# Patient Record
Sex: FEMALE | Race: WHITE | NOT HISPANIC OR LATINO | Employment: OTHER | URBAN - METROPOLITAN AREA
[De-identification: names, ages, dates, MRNs, and addresses within clinical notes are randomized per-mention and may not be internally consistent; named-entity substitution may affect disease eponyms.]

---

## 2017-08-15 ENCOUNTER — OFFICE VISIT (OUTPATIENT)
Dept: OBGYN | Facility: CLINIC | Age: 32
End: 2017-08-15
Payer: COMMERCIAL

## 2017-08-15 VITALS — HEIGHT: 62 IN | BODY MASS INDEX: 31.28 KG/M2 | WEIGHT: 170 LBS

## 2017-08-15 DIAGNOSIS — L73.9 FOLLICULITIS: Primary | ICD-10-CM

## 2017-08-15 PROCEDURE — 99213 OFFICE O/P EST LOW 20 MIN: CPT | Performed by: NURSE PRACTITIONER

## 2017-08-15 NOTE — MR AVS SNAPSHOT
"              After Visit Summary   8/15/2017    Julia Boss    MRN: [id number]           Patient Information     Date Of Birth          1985        Visit Information        Provider Department      8/15/2017 9:00 AM Monet Carlson APRN CNP [facility]         Follow-ups after your visit        Follow-up notes from your care team     Return if symptoms worsen or fail to improve.      Who to contact     If you have questions or need follow up information about today's clinic visit or your schedule please contact [facility] directly at [phone].  Normal or non-critical lab and imaging results will be communicated to you by VitalMedixhart, letter or phone within 4 business days after the clinic has received the results. If you do not hear from us within 7 days, please contact the clinic through VitalMedixhart or phone. If you have a critical or abnormal lab result, we will notify you by phone as soon as possible.  Submit refill requests through Akvo or call your pharmacy and they will forward the refill request to us. Please allow 3 business days for your refill to be completed.          Additional Information About Your Visit        MyChart Information     Akvo lets you send messages to your doctor, view your test results, renew your prescriptions, schedule appointments and more. To sign up, go to [email or web address] . Click on \"Log in\" on the left side of the screen, which will take you to the Welcome page. Then click on \"Sign up Now\" on the right side of the page.     You will be asked to enter the access code listed below, as well as some personal information. Please follow the directions to create your username and password.     Your access code is: [email or web address]  Expires: 2017  9:37 AM     Your access code will  in 90 days. If you need help or a new code, please call your [facility] or [phone].        Care EveryWhere ID     This " "is your Care EveryWhere ID. This could be used by other organizations to access your [place] medical records  [id number]        Your Vitals Were     Height Last Period BMI (Body Mass Index)             5' 2.25\" (1.581 m) 07/25/2017 (Approximate) 30.84 kg/m2          Blood Pressure from Last 3 Encounters:   08/31/16 116/68   02/29/16 110/68   07/06/15 104/64    Weight from Last 3 Encounters:   08/15/17 170 lb (77.1 kg)   08/31/16 169 lb (76.7 kg)   02/29/16 156 lb (70.8 kg)              Today, you had the following     No orders found for display       Primary Care Provider    None Specified       No primary provider on file.        Equal Access to Services     NATASHA CASTRO : Suresh Schafer, waleobardo oates, qaybta kaalmada conchita, nicanor castro. So [facility] [phone].    ATENCIÓN: Si habla español, tiene a knight disposición servicios gratuitos de asistencia lingüística. Llame al [phone].    We comply with applicable federal civil rights laws and [place] laws. We do not discriminate on the basis of race, color, national origin, age, disability sex, sexual orientation or gender identity.            Thank you!     Thank you for choosing [facility]  for your care. Our goal is always to provide you with excellent care. Hearing back from our patients is one way we can continue to improve our services. Please take a few minutes to complete the written survey that you may receive in the mail after your visit with us. Thank you!             Your Updated Medication List - Protect others around you: Learn how to safely use, store and throw away your medicines at www.disposemymeds.org.      Notice  As of 8/15/2017  9:37 AM    You have not been prescribed any medications.      "

## 2017-08-15 NOTE — PROGRESS NOTES
"    SUBJECTIVE:                                                   Julia Boss is a 31 year old female who presents to clinic today for the following health issue(s):  Patient presents with:  Vaginal Problem: Vaginal bump on labia, patient states it does not hurt nor bother her      HPI: Patient has noticed a bump on left labia, not really painful does feel hard.  Patient does shave in area.      Patient's last menstrual period was 2017 (approximate)..   Patient is not sexually active, .  Using none for contraception.    reports that she has never smoked. She has never used smokeless tobacco.      STD testing offered?  Declined    Health maintenance updated:  yes    Today's PHQ-2 Score:   PHQ-2 (  [employer]) 2016   Q1: Little interest or pleasure in doing things 0   Q2: Feeling down, depressed or hopeless 0   PHQ-2 Score 0     Today's PHQ-9 Score:   PHQ-9 SCORE 2016   Total Score 5     Today's ASAEL-7 Score:   ASAEL-7 SCORE 2016   Total Score 2       Problem list and histories reviewed & adjusted, as indicated.  Additional history: as documented.    Patient Active Problem List   Diagnosis     Overweight     Acne, unspecified acne type     Uses oral contraception     Herpes simplex vulvovaginitis     Anxiety     Moderate episode of recurrent major depressive disorder (H)     Past Surgical History:   Procedure Laterality Date     NO HISTORY OF SURGERY        Social History   Substance Use Topics     Smoking status: Never Smoker     Smokeless tobacco: Never Used     Alcohol use Not on file      Problem (# of Occurrences) Relation (Name,Age of Onset)    Thyroid Disease (1) Mother            No current outpatient prescriptions on file.     No current facility-administered medications for this visit.      No Known Allergies    ROS:  12 point review of systems negative other than symptoms noted below.    OBJECTIVE:     Ht 5' 2.25\" (1.581 m)  Wt 170 lb (77.1 kg)  LMP 2017 " (Approximate)  BMI 30.84 kg/m2  Body mass index is 30.84 kg/(m^2).    Exam:  Constitutional:  Appearance: Well nourished, well developed alert, in no acute distress   External genitalia right labia normal.  Left labia right inside is a hard bump appears to be a plugged duct.  Area is not red or tender to patient. When area is applied pressure can see white core, but unable to express anything.    Patient to hot pack area 2-3 x day.    In-Clinic Test Results:  No results found for this or any previous visit (from the past 24 hour(s)).    ASSESSMENT/PLAN:                                                      No diagnosis found.    There are no Patient Instructions on file for this visit.    Return prn or if lump persisting after hot packing or redness, tenderness, fever developes.    YSABEL Bobby [facility]

## 2017-09-21 ENCOUNTER — RESULT FOLLOW UP (OUTPATIENT)
Dept: OBGYN | Facility: CLINIC | Age: 32
End: 2017-09-21

## 2017-09-21 ENCOUNTER — OFFICE VISIT (OUTPATIENT)
Dept: OBGYN | Facility: CLINIC | Age: 32
End: 2017-09-21
Payer: COMMERCIAL

## 2017-09-21 VITALS
BODY MASS INDEX: 27.06 KG/M2 | HEIGHT: 66 IN | DIASTOLIC BLOOD PRESSURE: 85 MMHG | SYSTOLIC BLOOD PRESSURE: 101 MMHG | HEART RATE: 71 BPM | WEIGHT: 168.4 LBS

## 2017-09-21 DIAGNOSIS — Z12.4 CERVICAL CANCER SCREENING: ICD-10-CM

## 2017-09-21 DIAGNOSIS — Z01.419 ENCOUNTER FOR GYNECOLOGICAL EXAMINATION WITHOUT ABNORMAL FINDING: Primary | ICD-10-CM

## 2017-09-21 DIAGNOSIS — Z11.3 SCREEN FOR STD (SEXUALLY TRANSMITTED DISEASE): ICD-10-CM

## 2017-09-21 DIAGNOSIS — Z11.8 SCREENING FOR CHLAMYDIAL DISEASE: ICD-10-CM

## 2017-09-21 PROCEDURE — 88175 CYTOPATH C/V AUTO FLUID REDO: CPT | Performed by: OBSTETRICS & GYNECOLOGY

## 2017-09-21 PROCEDURE — 99395 PREV VISIT EST AGE 18-39: CPT | Performed by: OBSTETRICS & GYNECOLOGY

## 2017-09-21 PROCEDURE — 86780 TREPONEMA PALLIDUM: CPT | Performed by: OBSTETRICS & GYNECOLOGY

## 2017-09-21 PROCEDURE — 87591 N.GONORRHOEAE DNA AMP PROB: CPT | Performed by: OBSTETRICS & GYNECOLOGY

## 2017-09-21 PROCEDURE — 87491 CHLMYD TRACH DNA AMP PROBE: CPT | Performed by: OBSTETRICS & GYNECOLOGY

## 2017-09-21 PROCEDURE — 87624 HPV HI-RISK TYP POOLED RSLT: CPT | Performed by: OBSTETRICS & GYNECOLOGY

## 2017-09-21 PROCEDURE — 36415 COLL VENOUS BLD VENIPUNCTURE: CPT | Performed by: OBSTETRICS & GYNECOLOGY

## 2017-09-21 PROCEDURE — 87389 HIV-1 AG W/HIV-1&-2 AB AG IA: CPT | Performed by: OBSTETRICS & GYNECOLOGY

## 2017-09-21 ASSESSMENT — ANXIETY QUESTIONNAIRES
1. FEELING NERVOUS, ANXIOUS, OR ON EDGE: NOT AT ALL
IF YOU CHECKED OFF ANY PROBLEMS ON THIS QUESTIONNAIRE, HOW DIFFICULT HAVE THESE PROBLEMS MADE IT FOR YOU TO DO YOUR WORK, TAKE CARE OF THINGS AT HOME, OR GET ALONG WITH OTHER PEOPLE: NOT DIFFICULT AT ALL
6. BECOMING EASILY ANNOYED OR IRRITABLE: NOT AT ALL
5. BEING SO RESTLESS THAT IT IS HARD TO SIT STILL: NOT AT ALL
7. FEELING AFRAID AS IF SOMETHING AWFUL MIGHT HAPPEN: NOT AT ALL
2. NOT BEING ABLE TO STOP OR CONTROL WORRYING: NOT AT ALL
3. WORRYING TOO MUCH ABOUT DIFFERENT THINGS: NOT AT ALL
GAD7 TOTAL SCORE: 1

## 2017-09-21 ASSESSMENT — PATIENT HEALTH QUESTIONNAIRE - PHQ9
SUM OF ALL RESPONSES TO PHQ QUESTIONS 1-9: 3
5. POOR APPETITE OR OVEREATING: SEVERAL DAYS

## 2017-09-21 NOTE — PROGRESS NOTES
Julia is a 31 year old  female who presents for annual exam.     Besides routine health maintenance,  she would like to consult about IUD.    HPI:  The patient has no PCP on file.    Wants STI screening.  Having some pain with intercourse after 1yr abstinence. Has only been SA now for about 10d. No lubricant. Using condoms    Wants IUD. Feels mood waivering on OCPs. Off OCPs for 6-7mo. Period regular. No heavy/painful periods.    Has been on antidepressant in past, self dc'd. Feels like she wants to be off meds for a while, get to a natural state and seems to do best here.\  Not sure she ever wants kids.   Has hx cystic acne, has never seemed to be hormonally responsive      GYNECOLOGIC HISTORY:    Patient's last menstrual period was 09/10/2017.  Her current contraception method is: oral contraceptives.  She  reports that she has never smoked. She has never used smokeless tobacco.  Patient is sexually active.  STD testing offered? Accepted    Last PHQ-9 score on record =   PHQ-9 SCORE 2017   Total Score 3     Last GAD7 score on record =   ASAEL-7 SCORE 2017   Total Score 1     Alcohol Score = 4    HEALTH MAINTENANCE:  Cholesterol: NA  Last Mammo:NA  Pap:2016 HPV +, Next Pap:Today  Colonoscopy:Never  Dexa:Never    Health maintenance updated:  yes    HISTORY:  Obstetric History       T0      L0     SAB0   TAB0   Ectopic0   Multiple0   Live Births0           Patient Active Problem List   Diagnosis     Overweight     Acne, unspecified acne type     Uses oral contraception     Herpes simplex vulvovaginitis     Anxiety     Moderate episode of recurrent major depressive disorder (H)     Past Surgical History:   Procedure Laterality Date     NO HISTORY OF SURGERY        Social History   Substance Use Topics     Smoking status: Never Smoker     Smokeless tobacco: Never Used     Alcohol use Not on file      Problem (# of Occurrences) Relation (Name,Age of Onset)    Thyroid Disease (1)  "Mother            No current outpatient prescriptions on file.     No current facility-administered medications for this visit.      No Known Allergies    Past medical, surgical, social and family histories were reviewed and updated in EPIC.    ROS:   12 point review of systems negative other than symptoms noted below.  Genitourinary: Painful [place] and Vaginal Itching  Skin: Acne    EXAM:  /85  Pulse 71  Ht 5' 5.5\" (1.664 m)  Wt 168 lb 6.4 oz (76.4 kg)  LMP 09/10/2017  BMI 27.6 kg/m2   BMI: Body mass index is 27.6 kg/(m^2).    PHYSICAL EXAM:  Constitutional:  Appearance: Well nourished, well developed, alert, in no acute distress  Neck:  Lymph Nodes:  No lymphadenopathy present    Thyroid:  Gland size normal, nontender, no nodules or masses present  on palpation  Chest:  Respiratory Effort:  Breathing unlabored  Cardiovascular:    Heart: Auscultation:  Regular rate, normal rhythm, no murmurs present  Breasts: Inspection of Breasts:  No lymphadenopathy present., Palpation of Breasts and Axillae:  No masses present on palpation, no breast tenderness., Axillary Lymph Nodes:  No lymphadenopathy present. and No nodularity, asymmetry or nipple discharge bilaterally.  Gastrointestinal:   Abdominal Examination:  Abdomen nontender to palpation, tone normal without rigidity or guarding, no masses present, umbilicus without lesions   Liver and Spleen:  No hepatomegaly present, liver nontender to palpation    Hernias:  No hernias present  Lymphatic: Lymph Nodes:  No other lymphadenopathy present  Skin:  General Inspection:  No rashes present, no lesions present, no areas of  discoloration    Genitalia and Groin:  No rashes present, no lesions present, no areas of  discoloration, no masses present  Neurologic/Psychiatric:    Mental Status:  Oriented X3     Pelvic Exam:  External Genitalia:     Normal appearance for age, no discharge present, no tenderness present, no inflammatory lesions present, color " normal  Vagina:     Normal vaginal vault without central or paravaginal defects, no discharge present, no inflammatory lesions present, no masses present  Bladder:     Nontender to palpation  Urethra:   Urethral Body:  Urethra palpation normal, urethra structural support normal   Urethral Meatus:  No erythema or lesions present  Cervix:     Appearance healthy, no lesions present, nontender to palpation, no bleeding present  Uterus:     Uterus: firm, normal sized and nontender, retroverted in position.   Adnexa:     No adnexal tenderness present, no adnexal masses present  Perineum:     Perineum within normal limits, no evidence of trauma, no rashes or skin lesions present  Anus:     Anus within normal limits, no hemorrhoids present  Inguinal Lymph Nodes:     No lymphadenopathy present  Pubic Hair:     Normal pubic hair distribution for age  Genitalia and Groin:     No rashes present, no lesions present, no areas of discoloration, no masses present      COUNSELING:   Reviewed preventive health counseling, as reflected in patient instructions       Regular exercise       Healthy diet/nutrition       Osteoporosis Prevention/Bone Health      BMI: Body mass index is 27.6 kg/(m^2).        ASSESSMENT:  31 year old female with satisfactory annual exam.  ICD-10-CM    1. Encounter for gynecological examination without abnormal finding Z01.419 Pap imaged thin layer screen with HPV - recommended age 30 - 65     HPV High Risk Types DNA Cervical   2. Screening for chlamydial disease Z11.8 CHLAMYDIA TRACHOMATIS PCR   3. Screen for STD (sexually transmitted disease) Z11.3 NEISSERIA GONORRHOEA PCR     Anti Treponema     HIV Antigen Antibody Combo   4. Cervical cancer screening Z12.4 Pap imaged thin layer screen with HPV - recommended age 30 - 65       PLAN:  -Pap/hpv obtained for cervical cancer screening. Reviewed guidelines-pap q 3yrs until age 30 when co-testing q 5 years.  -Breast self awareness discussed.   -Discussed  exercise-making plan, strength training. Nutrition encouraged.  -Osteoporosis prevention discussed.  -Desires STI labs  -Discussed contraceptive options, different IUDs. Is considering paragard vs Kyleena. Risks/benefits reviewed. Pt will return for placement. Preprocedure NSAID discussed  -Return one year for next annual exam      Courtney Rosenthals, DO

## 2017-09-21 NOTE — MR AVS SNAPSHOT
After Visit Summary   9/21/2017    Julia Boss    MRN: [id number]           Patient Information     Date Of Birth          1985        Visit Information        Provider Department      9/21/2017 2:40 PM Courtney Ricci DO [facility]        Today's Diagnoses     Encounter for gynecological examination without abnormal finding    -  1    Screening for chlamydial disease        Screen for STD (sexually transmitted disease)        Cervical cancer screening           Follow-ups after your visit        Follow-up notes from your care team     Return in about 1 year (around 9/21/2018).      Your next 10 appointments already scheduled     Oct 12, 2017  9:30 AM CDT   Office Visit with Courtney Ricci DO   [facility] ([facility])    [address] [place] [id number] [phone]           Bring a current list of meds and any records pertaining to this visit. For Physicals, please bring immunization records and any forms needing to be filled out. Please arrive 10 minutes early to complete paperwork.              Who to contact     If you have questions or need follow up information about today's clinic visit or your schedule please contact [facility] directly at [phone].  Normal or non-critical lab and imaging results will be communicated to you by MyChart, letter or phone within 4 business days after the clinic has received the results. If you do not hear from us within 7 days, please contact the clinic through MyChart or phone. If you have a critical or abnormal lab result, we will notify you by phone as soon as possible.  Submit refill requests through UIBLUEPRINT or call your pharmacy and they will forward the refill request to us. Please allow 3 business days for your refill to be completed.          Additional Information About Your Visit        MyChart Information      "[employer] lets you send messages to your doctor, view your test results, renew your prescriptions, schedule appointments and more. To sign up, go to [email or web address] . Click on \"Log in\" on the left side of the screen, which will take you to the Welcome page. Then click on \"Sign up Now\" on the right side of the page.     You will be asked to enter the access code listed below, as well as some personal information. Please follow the directions to create your username and password.     Your access code is: [email or web address]  Expires: 2017  9:37 AM     Your access code will  in 90 days. If you need help or a new code, please call your [place] clinic or [phone].        Care EveryWhere ID     This is your Care EveryWhere ID. This could be used by other organizations to access your [place] medical records  [id number]        Your Vitals Were     Pulse Height Last Period BMI (Body Mass Index)          71 5' 5.5\" (1.664 m) 09/10/2017 27.6 kg/m2         Blood Pressure from Last 3 Encounters:   17 101/85   16 116/68   16 110/68    Weight from Last 3 Encounters:   17 168 lb 6.4 oz (76.4 kg)   08/15/17 170 lb (77.1 kg)   16 169 lb (76.7 kg)              We Performed the Following     Anti Treponema     CHLAMYDIA TRACHOMATIS PCR     HIV Antigen Antibody Combo     HPV High Risk Types DNA Cervical     NEISSERIA GONORRHOEA PCR     Pap imaged thin layer screen with HPV - recommended age 30 - 65        Primary Care Provider    None Specified       No primary provider on file.        Equal Access to Services     NATASHA CASTRO : Hadlucille Pederson, nicanor stoddard. So [facility] [phone].    ATENCIÓN: Si habla español, tiene a knight disposición servicios gratuitos de asistencia lingüística. Llame al [phone].    We comply with applicable federal civil rights laws and [place] laws. We do not discriminate on " the basis of race, color, national origin, age, disability sex, sexual orientation or gender identity.            Thank you!     Thank you for choosing [facility]  for your care. Our goal is always to provide you with excellent care. Hearing back from our patients is one way we can continue to improve our services. Please take a few minutes to complete the written survey that you may receive in the mail after your visit with us. Thank you!             Your Updated Medication List - Protect others around you: Learn how to safely use, store and throw away your medicines at www.disposemymeds.org.      Notice  As of 9/21/2017 10:09 PM    You have not been prescribed any medications.

## 2017-09-22 ASSESSMENT — ANXIETY QUESTIONNAIRES: GAD7 TOTAL SCORE: 1

## 2017-09-23 LAB — T PALLIDUM IGG+IGM SER QL: NEGATIVE

## 2017-09-24 LAB
C TRACH DNA SPEC QL NAA+PROBE: NEGATIVE
N GONORRHOEA DNA SPEC QL NAA+PROBE: NEGATIVE
SPECIMEN SOURCE: NORMAL
SPECIMEN SOURCE: NORMAL

## 2017-09-25 LAB — HIV 1+2 AB+HIV1 P24 AG SERPL QL IA: NONREACTIVE

## 2017-09-26 LAB
COPATH REPORT: NORMAL
PAP: NORMAL

## 2017-09-27 LAB
FINAL DIAGNOSIS: ABNORMAL
HPV HR 12 DNA CVX QL NAA+PROBE: POSITIVE
HPV16 DNA SPEC QL NAA+PROBE: NEGATIVE
HPV18 DNA SPEC QL NAA+PROBE: NEGATIVE
SPECIMEN DESCRIPTION: ABNORMAL

## 2017-09-28 NOTE — PROGRESS NOTES
8/31/16 NIL/+ HR HPV (not 16/18).   9/21/17 NIL/+ HR HPV (not 16/18). Plan: colposcopy by 12/21/17  10/3/17 spoke with pt and advised of result and follow up  10/20/17 Colpo.  Bx-1:00-PAULY 1, 7:00-benign. Ecc-benign . Plan: cotest in 1 year  10/29/18  NIL/+ HR HPV (not 16/18). Plan: colpo  11/5/18 left msg/advised of result and follow up(lks)  11/15/18 Colpo: Bx- PAULY II. Plan: LEEP (lks)  1/8/19 LEEP - PAULY II- III. ECC-neg. Neg Margins. Plan: cotest in 1 year (lks)  11/25/19 NIL pap, + HR HPV (not 16/18). Plan: colpo (lks)  12/03/19:Pt was advised. (sk)  12/9/19 colpo: ECC negative. Plan: cotest in 1 year (e)

## 2017-10-12 ENCOUNTER — OFFICE VISIT (OUTPATIENT)
Dept: OBGYN | Facility: CLINIC | Age: 32
End: 2017-10-12
Payer: COMMERCIAL

## 2017-10-12 VITALS — SYSTOLIC BLOOD PRESSURE: 110 MMHG | DIASTOLIC BLOOD PRESSURE: 70 MMHG | BODY MASS INDEX: 27.86 KG/M2 | WEIGHT: 170 LBS

## 2017-10-12 DIAGNOSIS — J02.9 ACUTE PHARYNGITIS, UNSPECIFIED ETIOLOGY: ICD-10-CM

## 2017-10-12 DIAGNOSIS — Z01.812 PRE-PROCEDURE LAB EXAM: ICD-10-CM

## 2017-10-12 DIAGNOSIS — Z30.430 ENCOUNTER FOR IUD INSERTION: Primary | ICD-10-CM

## 2017-10-12 LAB
BETA HCG QUAL IFA URINE: NEGATIVE
DEPRECATED S PYO AG THROAT QL EIA: NORMAL
SPECIMEN SOURCE: NORMAL

## 2017-10-12 PROCEDURE — 87880 STREP A ASSAY W/OPTIC: CPT | Performed by: OBSTETRICS & GYNECOLOGY

## 2017-10-12 PROCEDURE — 87081 CULTURE SCREEN ONLY: CPT | Performed by: OBSTETRICS & GYNECOLOGY

## 2017-10-12 PROCEDURE — 84703 CHORIONIC GONADOTROPIN ASSAY: CPT | Performed by: OBSTETRICS & GYNECOLOGY

## 2017-10-12 PROCEDURE — 58300 INSERT INTRAUTERINE DEVICE: CPT | Performed by: OBSTETRICS & GYNECOLOGY

## 2017-10-12 NOTE — PROGRESS NOTES
INDICATIONS:                                                      Is a pregnancy test required: Yes.  Was it positive or negative?  Negative  Was a consent obtained?  Yes    Julia Boss is a 31 year old female,   who presents for insertion of an IUD. The risks, benefits and alternatives of IUD insertion were discussed in detail previously. She also has reviewed the product brochure.  She has elected to go ahead with the insertion today and her questions were answered. Consent was signed. Her LMP began on 10/8/17 and was normal in duration and amount of flow. A pregnancy test was performed today:  Yes    Kyleena IUD: Lot [id number], Exp 2018    Periods regular each month, 5-7d, flow not heavy, not too painful.  Feels her acne is perhaps more related to stress than anything. Has not changed since being off birth control.     LMP 4d ago      Coming next week for colpo.  Thinks has sore throat, woud like tested for strep. No exposures. No fevers.        PROCEDURE:                                                      The pelvic exam revealed normal external genitalia. On bimanual exam the uterus was Retroverted and normal in size with no tenderness present. A speculum was inserted into the vagina and the cervix was visualized. The cervix was prepped with Betadine . The anterior lip of the cervix was grasped with a single toothed tenaculum. The uterus sounded to 7 cm. A Kyleena IUD was then inserted without difficulty. The string was cut to 3 cm.  The patient experienced a mild amount of cramping.    POST PROCEDURE:                                                      She  tolerated the procedure well. There were no complications. Patient was discharged in stable condition.    Return to clinic in 5 weeks for IUD check.  Call if severe cramping, fever, abnormal bleeding, abnormal discharge or pelvic pain develop.  Patient was counseled about the chance of irregular bleeding. No sex/tampons x 1wk, condoms  to be used then x 3wk.    Rapid strep obtained for sore throat. Will notify If positive.    Courtney Christie Masters, DO

## 2017-10-12 NOTE — MR AVS SNAPSHOT
"              After Visit Summary   10/12/2017    Julia Boss    MRN: [id number]           Patient Information     Date Of Birth          1985        Visit Information        Provider Department      10/12/2017 9:30 AM [facility]ney Ricci DO [facility]        Today's Diagnoses     Encounter for IUD insertion    -  1    Pre-procedure lab exam        Acute pharyngitis, unspecified etiology           Follow-ups after your visit        Follow-up notes from your care team     Return in about 4 weeks (around 11/9/2017).      Your next 10 appointments already scheduled     Oct 20, 2017 11:00 AM CDT   Colposcopy with Courtney Ricci DO, VERA COLPOSCOPE   [facility] ([facility])    [address] [place] [address] [phone]              Who to contact     If you have questions or need follow up information about today's clinic visit or your schedule please contact [facility] directly at [phone].  Normal or non-critical lab and imaging results will be communicated to you by MyChart, letter or phone within 4 business days after the clinic has received the results. If you do not hear from us within 7 days, please contact the clinic through [employer] or phone. If you have a critical or abnormal lab result, we will notify you by phone as soon as possible.  Submit refill requests through D4P or call your pharmacy and they will forward the refill request to us. Please allow 3 business days for your refill to be completed.          Additional Information About Your Visit        MyChart Information     D4P lets you send messages to your doctor, view your test results, renew your prescriptions, schedule appointments and more. To sign up, go to [email or web address] . Click on \"Log in\" on the left side of the screen, which will take you to the Welcome page. Then click on \"Sign up Now\" on " the right side of the page.     You will be asked to enter the access code listed below, as well as some personal information. Please follow the directions to create your username and password.     Your access code is: [email or web address]  Expires: 2017  9:37 AM     Your access code will  in 90 days. If you need help or a new code, please call your [place] clinic or [phone].        Care EveryWhere ID     This is your Care EveryWhere ID. This could be used by other organizations to access your [place] medical records  [id number]        Your Vitals Were     Last Period BMI (Body Mass Index)                10/08/2017 27.86 kg/m2           Blood Pressure from Last 3 Encounters:   10/12/17 110/70   17 101/85   16 116/68    Weight from Last 3 Encounters:   10/12/17 170 lb (77.1 kg)   17 168 lb 6.4 oz (76.4 kg)   08/15/17 170 lb (77.1 kg)              We Performed the Following     Beta HCG qual [facility] urine - [facility] and [place]     Beta strep group A culture     C KYLEENA IUD 19.5 MG     INSERTION INTRAUTERINE DEVICE     Strep, Rapid Screen          Today's Medication Changes          These changes are accurate as of: 10/12/17 10:57 PM.  If you have any questions, ask your nurse or doctor.               Start taking these medicines.        Dose/Directions    levonorgestrel 20 MCG/24HR IUD   Commonly known as:  MIRENA   Used for:  Encounter for IUD insertion   Started by:  Courtney Ricci,         Dose:  1 each   1 each (20 mcg) by Intrauterine route once for 1 dose Lot [id number], Exp 2018   Quantity:  1 each   Refills:  0            Where to get your medicines      Some of these will need a paper prescription and others can be bought over the counter.  Ask your nurse if you have questions.     You don't need a prescription for these medications     levonorgestrel 20 MCG/24HR IUD                Primary Care Provider    None Specified       No primary provider on file.        Equal  Access to Services     [facility]: Hadii aad ku hadsherinelizett Christianali, wacatieda luqadaha, qarosas kasilvananicanor east. So [facility] [phone].    ATENCIÓN: Si habla español, tiene a kngiht disposición servicios gratuitos de asistencia lingüística. Llame al [phone].    We comply with applicable federal civil rights laws and [place] laws. We do not discriminate on the basis of race, color, national origin, age, disability, sex, sexual orientation, or gender identity.            Thank you!     Thank you for choosing [facility] [place]  for your care. Our goal is always to provide you with excellent care. Hearing back from our patients is one way we can continue to improve our services. Please take a few minutes to complete the written survey that you may receive in the mail after your visit with us. Thank you!             Your Updated Medication List - Protect others around you: Learn how to safely use, store and throw away your medicines at www.disposemymeds.org.          This list is accurate as of: 10/12/17 10:57 PM.  Always use your most recent med list.                   Brand Name Dispense Instructions for use Diagnosis    levonorgestrel 20 MCG/24HR IUD    MIRENA    1 each    1 each (20 mcg) by Intrauterine route once for 1 dose Lot [id number], Exp 12/2018    Encounter for IUD insertion

## 2017-10-13 LAB
BACTERIA SPEC CULT: NORMAL
SPECIMEN SOURCE: NORMAL

## 2017-10-16 NOTE — PROGRESS NOTES
Labs are normal, strep testing negative x 2. Please call pt to notify.     Courtney Christie Masters, DO

## 2017-10-20 ENCOUNTER — OFFICE VISIT (OUTPATIENT)
Dept: OBGYN | Facility: CLINIC | Age: 32
End: 2017-10-20
Payer: COMMERCIAL

## 2017-10-20 VITALS
DIASTOLIC BLOOD PRESSURE: 62 MMHG | WEIGHT: 172 LBS | BODY MASS INDEX: 27.64 KG/M2 | HEIGHT: 66 IN | SYSTOLIC BLOOD PRESSURE: 104 MMHG

## 2017-10-20 DIAGNOSIS — R87.810 CERVICAL HIGH RISK HPV (HUMAN PAPILLOMAVIRUS) TEST POSITIVE: Primary | ICD-10-CM

## 2017-10-20 DIAGNOSIS — Z97.5 PRESENCE OF INTRAUTERINE CONTRACEPTIVE DEVICE: ICD-10-CM

## 2017-10-20 DIAGNOSIS — Z01.812 PRE-PROCEDURE LAB EXAM: ICD-10-CM

## 2017-10-20 LAB — BETA HCG QUAL IFA URINE: NEGATIVE

## 2017-10-20 PROCEDURE — 57454 BX/CURETT OF CERVIX W/SCOPE: CPT | Performed by: OBSTETRICS & GYNECOLOGY

## 2017-10-20 PROCEDURE — 88305 TISSUE EXAM BY PATHOLOGIST: CPT | Performed by: OBSTETRICS & GYNECOLOGY

## 2017-10-20 PROCEDURE — 84703 CHORIONIC GONADOTROPIN ASSAY: CPT | Performed by: OBSTETRICS & GYNECOLOGY

## 2017-10-20 NOTE — MR AVS SNAPSHOT
"              After Visit Summary   10/20/2017    Julia Boss    MRN: [id number]           Patient Information     Date Of Birth          1985        Visit Information        Provider Department      10/20/2017 11:00 AM Courtney Ricci DO; [facility] COLPOSCOPE [facility]        Today's Diagnoses     Cervical high risk HPV (human papillomavirus) test positive    -  1    Pre-procedure lab exam        Presence of intrauterine contraceptive device           Follow-ups after your visit        Follow-up notes from your care team     Return in about 4 weeks (around 11/17/2017) for IUD string check.      Who to contact     If you have questions or need follow up information about today's clinic visit or your schedule please contact [facility] directly at [phone].  Normal or non-critical lab and imaging results will be communicated to you by MyChart, letter or phone within 4 business days after the clinic has received the results. If you do not hear from us within 7 days, please contact the clinic through [employer] or phone. If you have a critical or abnormal lab result, we will notify you by phone as soon as possible.  Submit refill requests through [employer] or call your pharmacy and they will forward the refill request to us. Please allow 3 business days for your refill to be completed.          Additional Information About Your Visit        MyChart Information     [employer] lets you send messages to your doctor, view your test results, renew your prescriptions, schedule appointments and more. To sign up, go to [email or web address] . Click on \"Log in\" on the left side of the screen, which will take you to the Welcome page. Then click on \"Sign up Now\" on the right side of the page.     You will be asked to enter the access code listed below, as well as some personal information. Please follow the directions to create your username and password.     Your access " "code is: [email or web address]  Expires: 2017  9:37 AM     Your access code will  in 90 days. If you need help or a new code, please call your [place] clinic or [phone].        Care EveryWhere ID     This is your Care EveryWhere ID. This could be used by other organizations to access your [place] medical records  [id number]        Your Vitals Were     Height Last Period Breastfeeding? BMI (Body Mass Index)          5' 5.5\" (1.664 m) 10/08/2017 No 28.19 kg/m2         Blood Pressure from Last 3 Encounters:   10/20/17 104/62   10/12/17 110/70   17 101/85    Weight from Last 3 Encounters:   10/20/17 172 lb (78 kg)   10/12/17 170 lb (77.1 kg)   17 168 lb 6.4 oz (76.4 kg)              We Performed the Following     Beta HCG qual IFA urine - [facility] and [place]     COLPOSCOPY CERVIX/UPPER VAGINA W BX CERVIX     Surgical pathology exam        Primary Care Provider Office Phone # Fax #    [facility] [phone]       [address] [address]        Equal Access to Services     [facility]AS[facility] CASTRO [facility]: Hadii heaven ku hadasho Soomaali, waaxda luqadaha, qaybta kaalmada adeegyada, waxay idiin hayaan adeeg kharash la'aan . So [facility] [phone].    ATENCIÓN: Si habla español, tiene a knight disposición servicios gratuitos de asistencia lingüística. ame al [phone].    We comply with applicable federal civil rights laws and [place] laws. We do not discriminate on the basis of race, color, national origin, age, disability, sex, sexual orientation, or gender identity.            Thank you!     Thank you for choosing [facility] [place]  for your care. Our goal is always to provide you with excellent care. Hearing back from our patients is one way we can continue to improve our services. Please take a few minutes to complete the written survey that you may receive in the mail after your visit with us. Thank " you!             Your Updated Medication List - Protect others around you: Learn how to safely use, store and throw away your medicines at www.disposemymeds.org.          This list is accurate as of: 10/20/17 12:38 PM.  Always use your most recent med list.                   Brand Name Dispense Instructions for use Diagnosis    levonorgestrel 20 MCG/24HR IUD    MIRENA    1 each    1 each (20 mcg) by Intrauterine route once for 1 dose Lot [id number], Exp 12/2018    Encounter for IUD insertion

## 2017-10-20 NOTE — PROGRESS NOTES
INDICATIONS:                                                    Is a pregnancy test required: Yes.  Was it positive or negative?  Negative  Was a consent obtained?  Yes    Julia Boss, is a 31 year old female, who had a recent WNL pap.  HPV other type (+)pos.  No prior history of abnormal pap. Here today for colposcopy. Discussed indication, risks of infection and bleeding.    Her last pap was   Lab Results   Component Value Date    PAP NIL 09/21/2017    .    PROCEDURE:                                                      Cervix is stained with acetic acid and viewed colposcopically. Squamocolumnar junction is visualized in it's entirity. Mild-moderate circumferential acetowhite lesion(s). Biopsy done 1,7. Endocervical curretage Done    IUD strings visualized      POST PROCEDURE:                                                      IMPRESSION: Patient tolerated procedure well, colposcopy adequate and PAULY 1    PLAN : Await the results of the biopsies.  She  tolerated the procedure well. There were no complications. Patient was discharged in stable condition.    Patient advised to call the clinic if excessive bleeding, pelvic pain, or fever.     Follow-up plan based on pathology results.    Courtney Christie Masters, DO

## 2017-10-23 LAB — COPATH REPORT: NORMAL

## 2017-12-14 ENCOUNTER — OFFICE VISIT (OUTPATIENT)
Dept: OBGYN | Facility: CLINIC | Age: 32
End: 2017-12-14
Payer: COMMERCIAL

## 2017-12-14 VITALS
HEIGHT: 66 IN | DIASTOLIC BLOOD PRESSURE: 60 MMHG | BODY MASS INDEX: 28.28 KG/M2 | SYSTOLIC BLOOD PRESSURE: 100 MMHG | WEIGHT: 176 LBS

## 2017-12-14 DIAGNOSIS — Z30.431 IUD CHECK UP: Primary | ICD-10-CM

## 2017-12-14 PROCEDURE — 99213 OFFICE O/P EST LOW 20 MIN: CPT | Performed by: OBSTETRICS & GYNECOLOGY

## 2017-12-14 NOTE — MR AVS SNAPSHOT
"              After Visit Summary   2017    Julia Boss    MRN: [id number]           Patient Information     Date Of Birth          1985        Visit Information        Provider Department      2017 10:50 AM Courtney Ricci, DO [facility]         Follow-ups after your visit        Who to contact     If you have questions or need follow up information about today's clinic visit or your schedule please contact [facility] directly at [phone].  Normal or non-critical lab and imaging results will be communicated to you by MyChart, letter or phone within 4 business days after the clinic has received the results. If you do not hear from us within 7 days, please contact the clinic through [employer] or phone. If you have a critical or abnormal lab result, we will notify you by phone as soon as possible.  Submit refill requests through [employer] or call your pharmacy and they will forward the refill request to us. Please allow 3 business days for your refill to be completed.          Additional Information About Your Visit        MyChart Information     [employer] lets you send messages to your doctor, view your test results, renew your prescriptions, schedule appointments and more. To sign up, go to [email or web address] . Click on \"Log in\" on the left side of the screen, which will take you to the Welcome page. Then click on \"Sign up Now\" on the right side of the page.     You will be asked to enter the access code listed below, as well as some personal information. Please follow the directions to create your username and password.     Your access code is: [email or web address]  Expires: 3/14/2018 11:57 AM     Your access code will  in 90 days. If you need help or a new code, please call your [place] clinic or [phone].        Care EveryWhere ID     This is your Care EveryWhere ID. This could be used by other organizations to access your [place] " "medical records  [id number]        Your Vitals Were     Height Last Period BMI (Body Mass Index)             5' 5.5\" (1.664 m) 12/04/2017 (Approximate) 28.84 kg/m2          Blood Pressure from Last 3 Encounters:   12/14/17 100/60   10/20/17 104/62   10/12/17 110/70    Weight from Last 3 Encounters:   12/14/17 176 lb (79.8 kg)   10/20/17 172 lb (78 kg)   10/12/17 170 lb (77.1 kg)              Today, you had the following     No orders found for display       Primary Care Provider Office Phone # Fax #    [facility] [phone]       [place] [address] [address] [facility] [address]  [facility] [address]        Equal Access to Services     NATASHA CASTRO : Hadii heaven lion hadasho Sohusam, waaxda luqadaha, qaybta kaalmada adescar, nicanor ojeda . So [facility] [phone].    ATENCIÓN: Si habla español, tiene a knight disposición servicios gratuitos de asistencia lingüística. Tiburcio al [phone].    We comply with applicable federal civil rights laws and [place] laws. We do not discriminate on the basis of race, color, national origin, age, disability, sex, sexual orientation, or gender identity.            Thank you!     Thank you for choosing [facility]  for your care. Our goal is always to provide you with excellent care. Hearing back from our patients is one way we can continue to improve our services. Please take a few minutes to complete the written survey that you may receive in the mail after your visit with us. Thank you!             Your Updated Medication List - Protect others around you: Learn how to safely use, store and throw away your medicines at www.disposemymeds.org.      Notice  As of 12/14/2017 11:57 AM    You have not been prescribed any medications.      "

## 2017-12-14 NOTE — PROGRESS NOTES
SUBJECTIVE:                                                   Julia Boss is a 32 year old female who presents to clinic today for the following health issue(s):  Patient presents with:  IUD: IUD check. Had Kyleena inserted 10/12/17. Patient states has some cramping        HPI:  Doing well. Having some cramping, spotting. Not overly bothered at this point.    Patient's last menstrual period was 2017 (approximate)..   Patient is sexually active, .  Using IUD for contraception.    reports that she has never smoked. She has never used smokeless tobacco.      STD testing offered?  Declined    Health maintenance updated:  yes    Today's PHQ-2 Score:   PHQ-2 (  [employer]) 2016   Q1: Little interest or pleasure in doing things 0   Q2: Feeling down, depressed or hopeless 0   PHQ-2 Score 0     Today's PHQ-9 Score:   PHQ-9 SCORE 2017   Total Score 3     Today's ASAEL-7 Score:   ASAEL-7 SCORE 2017   Total Score 1       Problem list and histories reviewed & adjusted, as indicated.  Additional history: as documented.    Patient Active Problem List   Diagnosis     Overweight     Acne, unspecified acne type     Herpes simplex vulvovaginitis     Anxiety     Moderate episode of recurrent major depressive disorder (H)     Cervical high risk HPV (human papillomavirus) test positive     Presence of intrauterine contraceptive device     Past Surgical History:   Procedure Laterality Date     NO HISTORY OF SURGERY        Social History   Substance Use Topics     Smoking status: Never Smoker     Smokeless tobacco: Never Used     Alcohol use Not on file      Problem (# of Occurrences) Relation (Name,Age of Onset)    Thyroid Disease (1) Mother            Current Outpatient Prescriptions   Medication Sig     levonorgestrel (KYLEENA) 19.5 MG IUD 1 each by Intrauterine route once     No current facility-administered medications for this visit.      No Known Allergies    ROS:  12 point review of systems  "negative other than symptoms noted below.  Genitourinary: Cramps    OBJECTIVE:     /60  Ht 5' 5.5\" (1.664 m)  Wt 176 lb (79.8 kg)  LMP 12/04/2017 (Approximate)  BMI 28.84 kg/m2  Body mass index is 28.84 kg/(m^2).    Exam:  Constitutional:  Appearance: Well nourished, well developed alert, in no acute distress  Chest:  Respiratory Effort:  Breathing unlabored  Lymphatic: Lymph Nodes:  No other lymphadenopathy present  Skin:General Inspection:  No rashes present, no lesions present, no areas of discoloration; Genitalia and Groin:  No rashes present, no lesions present, no areas of discoloration, no masses present.  Neurologic/Psychiatric:  Mental Status:  Oriented X3   Pelvic Exam:  External Genitalia:     Normal appearance for age, no discharge present, no tenderness present, no inflammatory lesions present, color normal  Vagina:    Normal vaginal vault without central or paravaginal defects, no discharge present, no inflammatory lesions present, no masses present  Bladder:     Nontender to palpation  Urethra:   Urethral Body:  Urethra palpation normal, urethra structural support normal   Urethral Meatus:  No erythema or lesions present  Cervix:     Appearance healthy, no lesions present, nontender to palpation, no bleeding present, string present  Perineum:     Perineum within normal limits, no evidence of trauma, no rashes or skin lesions present  Anus:     Anus within normal limits, no hemorrhoids present  Inguinal Lymph Nodes:     No lymphadenopathy present  Pubic Hair:     Normal pubic hair distribution for age  Genitalia and Groin:     No rashes present, no lesions present, no areas of discoloration, no masses present       ASSESSMENT/PLAN:                                                        ICD-10-CM    1. IUD check up Z30.431          -Strings seen. Doing well. Reviewed SE, likely to improve over next month. If not, return for discussion of tx. Otherwise f/u yearly for string checks.   Will be " due for cotesting next year for f/u of HPV+ with PAULY I on biopsy.    Courtney Christie Masters, [facility] FOR [facility]

## 2018-05-30 ENCOUNTER — OFFICE VISIT (OUTPATIENT)
Dept: OBGYN | Facility: CLINIC | Age: 33
End: 2018-05-30
Payer: COMMERCIAL

## 2018-05-30 VITALS
BODY MASS INDEX: 27.66 KG/M2 | HEART RATE: 64 BPM | DIASTOLIC BLOOD PRESSURE: 66 MMHG | SYSTOLIC BLOOD PRESSURE: 102 MMHG | WEIGHT: 166 LBS | HEIGHT: 65 IN

## 2018-05-30 DIAGNOSIS — N89.8 VAGINAL DISCHARGE: Primary | ICD-10-CM

## 2018-05-30 DIAGNOSIS — R10.2 PELVIC PAIN IN FEMALE: ICD-10-CM

## 2018-05-30 DIAGNOSIS — Z30.09 ENCOUNTER FOR OTHER GENERAL COUNSELING OR ADVICE ON CONTRACEPTION: ICD-10-CM

## 2018-05-30 DIAGNOSIS — B37.9 YEAST INFECTION: ICD-10-CM

## 2018-05-30 LAB
SPECIMEN SOURCE: ABNORMAL
WET PREP SPEC: ABNORMAL

## 2018-05-30 PROCEDURE — 99213 OFFICE O/P EST LOW 20 MIN: CPT | Mod: 25 | Performed by: NURSE PRACTITIONER

## 2018-05-30 PROCEDURE — 87210 SMEAR WET MOUNT SALINE/INK: CPT | Performed by: NURSE PRACTITIONER

## 2018-05-30 PROCEDURE — 58301 REMOVE INTRAUTERINE DEVICE: CPT | Performed by: NURSE PRACTITIONER

## 2018-05-30 RX ORDER — FLUCONAZOLE 150 MG/1
150 TABLET ORAL
Qty: 2 TABLET | Refills: 0 | Status: SHIPPED | OUTPATIENT
Start: 2018-05-30 | End: 2018-10-29

## 2018-05-30 RX ORDER — AMOXICILLIN 875 MG
TABLET ORAL
Refills: 0 | COMMUNITY
Start: 2018-05-16 | End: 2018-10-29

## 2018-05-30 RX ORDER — NORETHINDRONE ACETATE AND ETHINYL ESTRADIOL 1MG-20(21)
1 KIT ORAL DAILY
Qty: 84 TABLET | Refills: 3 | Status: SHIPPED | OUTPATIENT
Start: 2018-05-30 | End: 2018-10-29

## 2018-05-30 NOTE — MR AVS SNAPSHOT
"              After Visit Summary   2018    Julia Boss    MRN: [id number]           Patient Information     Date Of Birth          1985        Visit Information        Provider Department      2018 9:00 AM Monet Carlson APRN CNP [facility]        Today's Diagnoses     Vaginal discharge    -  1    Yeast infection        Encounter for other general counseling or advice on contraception        Pelvic pain in female           Follow-ups after your visit        Who to contact     If you have questions or need follow up information about today's clinic visit or your schedule please contact [facility] directly at [phone].  Normal or non-critical lab and imaging results will be communicated to you by [employer]hart, letter or phone within 4 business days after the clinic has received the results. If you do not hear from us within 7 days, please contact the clinic through [employer] or phone. If you have a critical or abnormal lab result, we will notify you by phone as soon as possible.  Submit refill requests through [employer] or call your pharmacy and they will forward the refill request to us. Please allow 3 business days for your refill to be completed.          Additional Information About Your Visit        MyChart Information     MyWedding lets you send messages to your doctor, view your test results, renew your prescriptions, schedule appointments and more. To sign up, go to [email or web address] . Click on \"Log in\" on the left side of the screen, which will take you to the Welcome page. Then click on \"Sign up Now\" on the right side of the page.     You will be asked to enter the access code listed below, as well as some personal information. Please follow the directions to create your username and password.     Your access code is: [email or web address]  Expires: 2018  8:54 AM     Your access code will  in 90 days. If you need help or a new " "code, please call your [facility] clinic or [phone].        Care EveryWhere ID     This is your Care EveryWhere ID. This could be used by other organizations to access your [facility] medical records  [id number]        Your Vitals Were     Pulse Height BMI (Body Mass Index)             64 5' 5\" (1.651 m) 27.62 kg/m2          Blood Pressure from Last 3 Encounters:   05/30/18 102/66   12/14/17 100/60   10/20/17 104/62    Weight from Last 3 Encounters:   05/30/18 166 lb (75.3 kg)   12/14/17 176 lb (79.8 kg)   10/20/17 172 lb (78 kg)              We Performed the Following     IUD REMOVAL     Wet  Prep          Today's Medication Changes          These changes are accurate as of 5/30/18  9:39 AM.  If you have any questions, ask your nurse or doctor.               Start taking these medicines.        Dose/Directions    fluconazole 150 MG tablet   Commonly known as:  DIFLUCAN   Used for:  Yeast infection   Started by:  Monet Carlson APRN CNP        Dose:  150 mg   Take 1 tablet (150 mg) by mouth every 3 days   Quantity:  2 tablet   Refills:  0       norethindrone-ethinyl estradiol 1-20 MG-MCG per tablet   Commonly known as:  JUNEL FE 1/20   Used for:  Encounter for other general counseling or advice on contraception   Started by:  Monet Carlson APRN CNP        Dose:  1 tablet   Take 1 tablet by mouth daily   Quantity:  84 tablet   Refills:  3         Stop taking these medicines if you haven't already. Please contact your care team if you have questions.     KYLEENA 19.5 MG IUD   Generic drug:  levonorgestrel   Stopped by:  Monet Carlson APRN CNP                Where to get your medicines      These medications were sent to [facility]/pharmacy #[id number] - [place], [place] - [address] [address]     Phone:  [phone]     fluconazole 150 MG tablet    norethindrone-ethinyl estradiol 1-20 MG-MCG per tablet                Primary Care Provider Office Phone # Fax #    [place] " [facility] [phone]       [facility] [address]   [facility] [address]        Equal Access to Services     NATASHA CASTRO : Hadii aad ku hadsherineo Sodemetrioali, waaxda luqadaha, qaybta kaalmada adescar, nicanor monsivaisenrrique aguirre roney lynette castro. So [facility] [phone].    ATENCIÓN: Si habla español, tiene a knight disposición servicios gratuitos de asistencia lingüística. Tiburcio al [phone].    We comply with applicable federal civil rights laws and [place] laws. We do not discriminate on the basis of race, color, national origin, age, disability, sex, sexual orientation, or gender identity.            Thank you!     Thank you for choosing [facility]  for your care. Our goal is always to provide you with excellent care. Hearing back from our patients is one way we can continue to improve our services. Please take a few minutes to complete the written survey that you may receive in the mail after your visit with us. Thank you!             Your Updated Medication List - Protect others around you: Learn how to safely use, store and throw away your medicines at www.disposemymeds.org.          This list is accurate as of 5/30/18  9:39 AM.  Always use your most recent med list.                   Brand Name Dispense Instructions for use Diagnosis    amoxicillin 875 MG tablet    AMOXIL     TAKE 1 TABLET BY MOUTH TWICE A DAY FOR 10 DAYS        fluconazole 150 MG tablet    DIFLUCAN    2 tablet    Take 1 tablet (150 mg) by mouth every 3 days    Yeast infection       norethindrone-ethinyl estradiol 1-20 MG-MCG per tablet    JUNEL FE 1/20    84 tablet    Take 1 tablet by mouth daily    Encounter for other general counseling or advice on contraception

## 2018-05-30 NOTE — PROGRESS NOTES
SUBJECTIVE:                                                   Julia Boss is a 32 year old female who presents to clinic today for the following health issue(s):  Patient presents with:  Vaginal Problem: yeast infection/ IUD check         HPI: Patient c/o vaginal discharge, itching.  Had a yeast infection a few months ago also, used monistat.  Also considering removing IUD.  She has had constant cramping since it was placed in 2017.  She wanted to give it time to see if improved and did only slightly.  She also thinks the yeast infections are associated with the IUD.  Not sexually active at the moment.      No LMP recorded. Patient is not currently having periods (Reason: IUD)..   Patient is sexually active, .  Using IUD for contraception.    reports that she has never smoked. She has never used smokeless tobacco.    STD testing offered?  Declined    Health maintenance updated:  yes    Today's PHQ-2 Score:   PHQ-2 (  [employer]) 2016   Q1: Little interest or pleasure in doing things 0   Q2: Feeling down, depressed or hopeless 0   PHQ-2 Score 0     Today's PHQ-9 Score:   PHQ-9 SCORE 2017   Total Score 3     Today's ASAEL-7 Score:   ASAEL-7 SCORE 2017   Total Score 1       Problem list and histories reviewed & adjusted, as indicated.  Additional history: as documented.    Patient Active Problem List   Diagnosis     Overweight     Acne, unspecified acne type     Herpes simplex vulvovaginitis     Anxiety     Moderate episode of recurrent major depressive disorder (H)     Cervical high risk HPV (human papillomavirus) test positive     Presence of intrauterine contraceptive device     Past Surgical History:   Procedure Laterality Date     NO HISTORY OF SURGERY        Social History   Substance Use Topics     Smoking status: Never Smoker     Smokeless tobacco: Never Used     Alcohol use Not on file      Problem (# of Occurrences) Relation (Name,Age of Onset)    Thyroid Disease (1)  "Mother            Current Outpatient Prescriptions   Medication Sig     fluconazole (DIFLUCAN) 150 MG tablet Take 1 tablet (150 mg) by mouth every 3 days     norethindrone-ethinyl estradiol (JUNEL FE 1/20) 1-20 MG-MCG per tablet Take 1 tablet by mouth daily     amoxicillin (AMOXIL) 875 MG tablet TAKE 1 TABLET BY MOUTH TWICE A DAY FOR 10 DAYS     levonorgestrel (KYLEENA) 19.5 MG IUD 1 each by Intrauterine route once     No current facility-administered medications for this visit.      No Known Allergies    ROS:  12 point review of systems negative other than symptoms noted below.  Gastrointestinal: Abdominal Pain and Bloating  Genitourinary: Cramps, Vaginal Discharge, Vaginal Dryness and Vaginal Itching    OBJECTIVE:     /66  Pulse 64  Ht 5' 5\" (1.651 m)  Wt 166 lb (75.3 kg)  BMI 27.62 kg/m2  Body mass index is 27.62 kg/(m^2).    Exam:  Constitutional:  Appearance: Well nourished, well developed alert, in no acute distress   External genitalia appear normal.  Vagina moderate amount of white discharge and red and irritated.  Cervix IUD strings seen.  Wet mount was done.  Discussed methods, risks and benefits of birth control.  Patient has tried Lindsey and Aviane in past, just felt somewhat moodier on them.  But would like to try a lower estrogen pill and do continous cycles.     In-Clinic Test Results:  Results for orders placed or performed in visit on 05/30/18 (from the past 24 hour(s))   Wet  Prep   Result Value Ref Range    Specimen Description Vagina     Wet Prep Yeast seen (A)     Wet Prep No clue cells seen     Wet Prep No Trichomonas seen        ASSESSMENT/PLAN:                                                        ICD-10-CM    1. Vaginal discharge N89.8 Wet  Prep   2. Yeast infection B37.9 fluconazole (DIFLUCAN) 150 MG tablet   3. Encounter for other general counseling or advice on contraception Z30.09 norethindrone-ethinyl estradiol (JUNEL FE 1/20) 1-20 MG-MCG per tablet   4. Pelvic pain in female " R10.2 IUD REMOVAL       There are no Patient Instructions on file for this visit.    No contraindications to OC's will start loestrin  today.    Will call with update on how OC's are working.    YSABEL Bobby CNP  [facility]    INDICATIONS:                                                      Is a pregnancy test required: No.  Was a consent obtained?  Yes    Julia Boss is a 32 year old female,, No LMP recorded. Patient is not currently having periods (Reason: IUD). who presents today for IUD removal. Her current IUD was placed 2017 ago. She has had problems including cramping constant with the IUD. She requests removal of the IUD because she wants to change her method of contraception    Today's PHQ-2 Score:   PHQ-2 (  [employer]) 2016   Q1: Little interest or pleasure in doing things 0   Q2: Feeling down, depressed or hopeless 0   PHQ-2 Score 0       PROCEDURE:                                                      A speculum exam was performed and the cervix was visualized. The IUD string was visualized. Using ring forceps, the string  was grasped and the IUD removed intact.    POST PROCEDURE:                                                      The patient tolerated the procedure well. Patient was discharged in stable condition.    Call if bleeding, pain or fever occur. and Birth control counseling given.    YSABEL Bobby CNP

## 2018-10-29 ENCOUNTER — OFFICE VISIT (OUTPATIENT)
Dept: OBGYN | Facility: CLINIC | Age: 33
End: 2018-10-29
Payer: COMMERCIAL

## 2018-10-29 VITALS
DIASTOLIC BLOOD PRESSURE: 64 MMHG | SYSTOLIC BLOOD PRESSURE: 108 MMHG | HEART RATE: 68 BPM | HEIGHT: 66 IN | WEIGHT: 172 LBS | BODY MASS INDEX: 27.64 KG/M2

## 2018-10-29 DIAGNOSIS — Z01.419 ENCOUNTER FOR GYNECOLOGICAL EXAMINATION WITHOUT ABNORMAL FINDING: Primary | ICD-10-CM

## 2018-10-29 DIAGNOSIS — Z12.4 SCREENING FOR CERVICAL CANCER: ICD-10-CM

## 2018-10-29 DIAGNOSIS — Z30.09 ENCOUNTER FOR OTHER GENERAL COUNSELING OR ADVICE ON CONTRACEPTION: ICD-10-CM

## 2018-10-29 PROCEDURE — 99395 PREV VISIT EST AGE 18-39: CPT | Performed by: OBSTETRICS & GYNECOLOGY

## 2018-10-29 PROCEDURE — G0476 HPV COMBO ASSAY CA SCREEN: HCPCS | Performed by: OBSTETRICS & GYNECOLOGY

## 2018-10-29 PROCEDURE — 88175 CYTOPATH C/V AUTO FLUID REDO: CPT | Performed by: OBSTETRICS & GYNECOLOGY

## 2018-10-29 RX ORDER — NORETHINDRONE ACETATE AND ETHINYL ESTRADIOL 1MG-20(21)
1 KIT ORAL DAILY
Qty: 112 TABLET | Refills: 3 | Status: SHIPPED | OUTPATIENT
Start: 2018-10-29 | End: 2019-11-25

## 2018-10-29 ASSESSMENT — PATIENT HEALTH QUESTIONNAIRE - PHQ9
SUM OF ALL RESPONSES TO PHQ QUESTIONS 1-9: 2
5. POOR APPETITE OR OVEREATING: NOT AT ALL

## 2018-10-29 ASSESSMENT — ANXIETY QUESTIONNAIRES
1. FEELING NERVOUS, ANXIOUS, OR ON EDGE: MORE THAN HALF THE DAYS
2. NOT BEING ABLE TO STOP OR CONTROL WORRYING: NOT AT ALL
5. BEING SO RESTLESS THAT IT IS HARD TO SIT STILL: NOT AT ALL
GAD7 TOTAL SCORE: 5
6. BECOMING EASILY ANNOYED OR IRRITABLE: MORE THAN HALF THE DAYS
IF YOU CHECKED OFF ANY PROBLEMS ON THIS QUESTIONNAIRE, HOW DIFFICULT HAVE THESE PROBLEMS MADE IT FOR YOU TO DO YOUR WORK, TAKE CARE OF THINGS AT HOME, OR GET ALONG WITH OTHER PEOPLE: SOMEWHAT DIFFICULT
3. WORRYING TOO MUCH ABOUT DIFFERENT THINGS: SEVERAL DAYS
7. FEELING AFRAID AS IF SOMETHING AWFUL MIGHT HAPPEN: NOT AT ALL

## 2018-10-29 NOTE — MR AVS SNAPSHOT
"              After Visit Summary   10/29/2018    Julia Boss    MRN: [id number]           Patient Information     Date Of Birth          1985        Visit Information        Provider Department      10/29/2018 9:20 AM Courtney Ricci,  [facility] [place]        Today's Diagnoses     Encounter for gynecological examination without abnormal finding    -  1    Encounter for other general counseling or advice on contraception        Screening for cervical cancer           Follow-ups after your visit        Follow-up notes from your care team     Return in about 1 year (around 10/29/2019).      Who to contact     If you have questions or need follow up information about today's clinic visit or your schedule please contact [facility] [place] directly at [phone].  Normal or non-critical lab and imaging results will be communicated to you by Mail[employer]hart, letter or phone within 4 business days after the clinic has received the results. If you do not hear from us within 7 days, please contact the clinic through Mail[employer] or phone. If you have a critical or abnormal lab result, we will notify you by phone as soon as possible.  Submit refill requests through [employer] or call your pharmacy and they will forward the refill request to us. Please allow 3 business days for your refill to be completed.          Additional Information About Your Visit        MyChart Information     Om[employer]-ID lets you send messages to your doctor, view your test results, renew your prescriptions, schedule appointments and more. To sign up, go to [email or web address] . Click on \"Log in\" on the left side of the screen, which will take you to the Welcome page. Then click on \"Sign up Now\" on the right side of the page.     You will be asked to enter the access code listed below, as well as some personal information. Please follow the directions to create your username and password.     Your access code is: " [email or web address]  Expires: 2019  9:22 AM     Your access code will  in 90 days. If you need help or a new code, please call your [facility] or [phone].        Care EveryWhere ID     This is your Care EveryWhere ID. This could be used by other organizations to access your [facility] medical records  [id number]        Your Vitals Were     Pulse Height BMI (Body Mass Index)             68 5' 5.5\" (1.664 m) 28.19 kg/m2          Blood Pressure from Last 3 Encounters:   10/29/18 108/64   18 102/66   17 100/60    Weight from Last 3 Encounters:   10/29/18 172 lb (78 kg)   18 166 lb (75.3 kg)   17 176 lb (79.8 kg)              We Performed the Following     HPV High Risk Types DNA Cervical     Pap imaged thin layer screen with HPV - recommended age 30 - 65          Today's Medication Changes          These changes are accurate as of 10/29/18  1:55 PM.  If you have any questions, ask your nurse or doctor.               These medicines have changed or have updated prescriptions.        Dose/Directions    norethindrone-ethinyl estradiol 1-20 MG-MCG per tablet   Commonly known as:  JUNEL FE 1/20   This may have changed:  additional instructions   Used for:  Encounter for other general counseling or advice on contraception   Changed by:  Courtney Ricci, DO        Dose:  1 tablet   Take 1 tablet by mouth daily Skip placebo pills, takes continously   Quantity:  112 tablet   Refills:  3            Where to get your medicines      These medications were sent to [facility]/pharmacy #[id number] - [place], [place] - [address] [facility] [address]  [address] [facility] [address]     Phone:  [phone]     norethindrone-ethinyl estradiol 1-20 MG-MCG per tablet                Primary Care Provider Office Phone # Fax #    [facility] [phone]       [facility] [address]  [facility] [address]        Equal Access to Services     NATASHA CASTRO" AH: Suresh donovan Marjoriehusam, wacatieda luqadaha, qamasonta kamatias rachelelizabethnicanor chancesachinlizet castro. So [facility] [phone].    ATENCIÓN: Si habla español, tiene a knight disposición servicios gratuitos de asistencia lingüística. Llame al [phone].    We comply with applicable federal civil rights laws and [place] laws. We do not discriminate on the basis of race, color, national origin, age, disability, sex, sexual orientation, or gender identity.            Thank you!     Thank you for choosing [facility]  for your care. Our goal is always to provide you with excellent care. Hearing back from our patients is one way we can continue to improve our services. Please take a few minutes to complete the written survey that you may receive in the mail after your visit with us. Thank you!             Your Updated Medication List - Protect others around you: Learn how to safely use, store and throw away your medicines at www.disposemymeds.org.          This list is accurate as of 10/29/18  1:55 PM.  Always use your most recent med list.                   Brand Name Dispense Instructions for use Diagnosis    norethindrone-ethinyl estradiol 1-20 MG-MCG per tablet    JUNEL FE 1/20    112 tablet    Take 1 tablet by mouth daily Skip placebo pills, takes continously    Encounter for other general counseling or advice on contraception

## 2018-10-29 NOTE — PROGRESS NOTES
Julia is a 32 year old  female who presents for annual exam.     Besides routine health maintenance, refill on OCP, skips placebo pills.    HPI:  The patient's PCP is [facility].      Very happy with OCPs. Using extended dosing. No spotting or regular periods since.    Exercising some. Has been following with counselor and trying to manage stress. Knows she needs to connect with her friends more and do more socializing        GYNECOLOGIC HISTORY:    No LMP recorded. Patient is not currently having periods (Reason: Birth Control).  Her current contraception method is: oral contraceptives.  She  reports that she has never smoked. She has never used smokeless tobacco.    Patient is sexually active.  STD testing offered?  Declined  Last PHQ-9 score on record =   PHQ-9 SCORE 10/29/2018   Total Score 2     Last GAD7 score on record =   ASAEL-7 SCORE 10/29/2018   Total Score 5     Alcohol Score = 4    HEALTH MAINTENANCE:  Cholesterol: (No results found for: CHOL   Last Mammo: NA, Result: not applicable, Next Mammo: due at 41   Pap: (  Lab Results   Component Value Date    PAP NIL 2017    PAP NIL 2016    ) Other HPV+  Colonoscopy:  NA, Result: not applicable, Next Colonoscopy: due at 51 years.  Dexa:      Health maintenance updated:  yes    HISTORY:  Obstetric History       T0      L0     SAB0   TAB0   Ectopic0   Multiple0   Live Births0           Patient Active Problem List   Diagnosis     Overweight     Acne, unspecified acne type     Herpes simplex vulvovaginitis     Anxiety     Moderate episode of recurrent major depressive disorder (H)     Cervical high risk HPV (human papillomavirus) test positive     Presence of intrauterine contraceptive device     Past Surgical History:   Procedure Laterality Date     NO HISTORY OF SURGERY        Social History   Substance Use Topics     Smoking status: Never Smoker     Smokeless tobacco: Never Used     Alcohol use Not  "on file      Problem (# of Occurrences) Relation (Name,Age of Onset)    Thyroid Disease (1) Mother            Current Outpatient Prescriptions   Medication Sig     norethindrone-ethinyl estradiol (JUNEL FE 1/20) 1-20 MG-MCG per tablet Take 1 tablet by mouth daily Skip placebo pills, takes continously     [DISCONTINUED] norethindrone-ethinyl estradiol (JUNEL FE 1/20) 1-20 MG-MCG per tablet Take 1 tablet by mouth daily     No current facility-administered medications for this visit.      No Known Allergies    Past medical, surgical, social and family histories were reviewed and updated in EPIC.    ROS:   12 point review of systems negative other than symptoms noted below.  Constitutional: Weight Gain  Skin: Acne  Psychiatric: Anxiety and No    EXAM:  /64  Pulse 68  Ht 5' 5.5\" (1.664 m)  Wt 172 lb (78 kg)  BMI 28.19 kg/m2   BMI: Body mass index is 28.19 kg/(m^2).    PHYSICAL EXAM:  Constitutional:  Appearance: Well nourished, well developed, alert, in no acute distress  Neck:  Lymph Nodes:  No lymphadenopathy present    Thyroid:  Gland size normal, nontender, no nodules or masses present  on palpation  Chest:  Respiratory Effort:  Breathing unlabored  Cardiovascular:    Heart: Auscultation:  Regular rate, normal rhythm, no murmurs present  Breasts: Inspection of Breasts:  No lymphadenopathy present., Palpation of Breasts and Axillae:  No masses present on palpation, no breast tenderness., Axillary Lymph Nodes:  No lymphadenopathy present. and No nodularity, asymmetry or nipple discharge bilaterally.  Gastrointestinal:   Abdominal Examination:  Abdomen nontender to palpation, tone normal without rigidity or guarding, no masses present, umbilicus without lesions   Liver and Spleen:  No hepatomegaly present, liver nontender to palpation    Hernias:  No hernias present  Lymphatic: Lymph Nodes:  No other lymphadenopathy present  Skin:  General Inspection:  No rashes present, no lesions present, no areas of "  discoloration    Genitalia and Groin:  No rashes present, no lesions present, no areas of  discoloration, no masses present  Neurologic/Psychiatric:    Mental Status:  Oriented X3     Pelvic Exam:  External Genitalia:     Normal appearance for age, no discharge present, no tenderness present, no inflammatory lesions present, color normal  Vagina:     Normal vaginal vault without central or paravaginal defects, no discharge present, no inflammatory lesions present, no masses present  Bladder:     Nontender to palpation  Urethra:   Urethral Body:  Urethra palpation normal, urethra structural support normal   Urethral Meatus:  No erythema or lesions present  Cervix:     Appearance healthy, no lesions present, nontender to palpation, no bleeding present  Uterus:     Uterus: firm, normal sized and nontender, midplane in position.   Adnexa:     No adnexal tenderness present, no adnexal masses present  Perineum:     Perineum within normal limits, no evidence of trauma, no rashes or skin lesions present  Anus:     Anus within normal limits, no hemorrhoids present  Inguinal Lymph Nodes:     No lymphadenopathy present  Pubic Hair:     Normal pubic hair distribution for age  Genitalia and Groin:     No rashes present, no lesions present, no areas of discoloration, no masses present      COUNSELING:   Reviewed preventive health counseling, as reflected in patient instructions       Regular exercise       Contraception    BMI: Body mass index is 28.19 kg/(m^2).  Weight management plan: Discussed healthy diet and exercise guidelines and patient will follow up in 12 months in clinic to re-evaluate.    ASSESSMENT:  32 year old female with satisfactory annual exam.    ICD-10-CM    1. Encounter for gynecological examination without abnormal finding Z01.419 Pap imaged thin layer screen with HPV - recommended age 30 - 65     HPV High Risk Types DNA Cervical   2. Encounter for other general counseling or advice on contraception Z30.09  norethindrone-ethinyl estradiol (JUNEL FE 1/20) 1-20 MG-MCG per tablet   3. Screening for cervical cancer Z12.4 Pap imaged thin layer screen with HPV - recommended age 30 - 65     HPV High Risk Types DNA Cervical       PLAN:  -Pap/hpv obtained for f/u of HPV and PAULY I. Management per guidelines  -Breast self awareness discussed.   -Discussed exercise-making plan, strength training. Nutrition encouraged.  -Osteoporosis prevention discussed.  -Happy with OCPs. Refill given  -Return one year for next annual exam      Courtney Christie Masters, DO

## 2018-10-30 ASSESSMENT — ANXIETY QUESTIONNAIRES: GAD7 TOTAL SCORE: 5

## 2018-10-31 LAB
COPATH REPORT: NORMAL
PAP: NORMAL

## 2018-11-02 LAB
FINAL DIAGNOSIS: ABNORMAL
HPV HR 12 DNA CVX QL NAA+PROBE: POSITIVE
HPV16 DNA SPEC QL NAA+PROBE: NEGATIVE
HPV18 DNA SPEC QL NAA+PROBE: NEGATIVE
SPECIMEN DESCRIPTION: ABNORMAL
SPECIMEN SOURCE CVX/VAG CYTO: ABNORMAL

## 2018-11-15 ENCOUNTER — OFFICE VISIT (OUTPATIENT)
Dept: OBGYN | Facility: CLINIC | Age: 33
End: 2018-11-15
Payer: COMMERCIAL

## 2018-11-15 VITALS — DIASTOLIC BLOOD PRESSURE: 80 MMHG | SYSTOLIC BLOOD PRESSURE: 112 MMHG | WEIGHT: 174 LBS | BODY MASS INDEX: 28.51 KG/M2

## 2018-11-15 DIAGNOSIS — Z01.812 PRE-PROCEDURE LAB EXAM: ICD-10-CM

## 2018-11-15 DIAGNOSIS — R87.810 CERVICAL HIGH RISK HPV (HUMAN PAPILLOMAVIRUS) TEST POSITIVE: Primary | ICD-10-CM

## 2018-11-15 DIAGNOSIS — N87.0 CIN I (CERVICAL INTRAEPITHELIAL NEOPLASIA I): ICD-10-CM

## 2018-11-15 LAB — BETA HCG QUAL IFA URINE: NEGATIVE

## 2018-11-15 PROCEDURE — 57455 BIOPSY OF CERVIX W/SCOPE: CPT | Performed by: OBSTETRICS & GYNECOLOGY

## 2018-11-15 PROCEDURE — 88342 IMHCHEM/IMCYTCHM 1ST ANTB: CPT | Performed by: OBSTETRICS & GYNECOLOGY

## 2018-11-15 PROCEDURE — 88305 TISSUE EXAM BY PATHOLOGIST: CPT | Performed by: OBSTETRICS & GYNECOLOGY

## 2018-11-15 PROCEDURE — 84703 CHORIONIC GONADOTROPIN ASSAY: CPT | Performed by: OBSTETRICS & GYNECOLOGY

## 2018-11-15 NOTE — PROGRESS NOTES
INDICATIONS:                                                    Is a pregnancy test required: Yes.  Was it positive or negative?  Negative  Was a consent obtained?  Yes    Julia oBss, is a 33 year old female, who had a recent WNL pap.  HPV other (+)pos.  Yes prior history of abnormal pap. Here today for colposcopy. Discussed indication, risks of infection and bleeding.    Her last pap was   Lab Results   Component Value Date    PAP NIL 10/29/2018      Pap history: 2016 NIL/Oth HR+, 2017 NIL/Other HR+-->colpo benign ECC, PAULY I bx. 10/18 NIL/HPV other HR+    PROCEDURE:                                                      Cervix is stained with acetic acid and viewed colposcopically. Squamocolumnar junction is visualized in it's entirety. Mild acetowhite lesion(s) noted at 9 to 3 o'clock . Biopsy done Yes, 11, 1. Endocervical curretage Not Done       POST PROCEDURE:                                                      IMPRESSION: Patient tolerated procedure well, colposcopy adequate and PAULY 1    PLAN : Await the results of the biopsies.  She  tolerated the procedure well. There were no complications. Patient was discharged in stable condition.    Patient advised to call the clinic if excessive bleeding, pelvic pain, or fever.     Follow-up plan based on pathology results.    Courtney Christie Masters, DO

## 2018-11-15 NOTE — MR AVS SNAPSHOT
"              After Visit Summary   11/15/2018    Julia Boss    MRN: [id number]           Patient Information     Date Of Birth          1985        Visit Information        Provider Department      11/15/2018 1:30 PM Courtney Ricci DO; WE COLPOSCOPE 1 [facility]        Today's Diagnoses     Cervical high risk HPV (human papillomavirus) test positive    -  1    Pre-procedure lab exam        PAULY I (cervical intraepithelial neoplasia I)           Follow-ups after your visit        Who to contact     If you have questions or need follow up information about today's clinic visit or your schedule please contact [facility] directly at [phone].  Normal or non-critical lab and imaging results will be communicated to you by MyChart, letter or phone within 4 business days after the clinic has received the results. If you do not hear from us within 7 days, please contact the clinic through MyChart or phone. If you have a critical or abnormal lab result, we will notify you by phone as soon as possible.  Submit refill requests through T-System or call your pharmacy and they will forward the refill request to us. Please allow 3 business days for your refill to be completed.          Additional Information About Your Visit        MyChart Information     T-System lets you send messages to your doctor, view your test results, renew your prescriptions, schedule appointments and more. To sign up, go to [email or web address] . Click on \"Log in\" on the left side of the screen, which will take you to the Welcome page. Then click on \"Sign up Now\" on the right side of the page.     You will be asked to enter the access code listed below, as well as some personal information. Please follow the directions to create your username and password.     Your access code is: [email or web address]  Expires: 2019  8:22 AM     Your access code will  in 90 days. If you need help or " a new code, please call your [place] clinic or [phone].        Care EveryWhere ID     This is your Care EveryWhere ID. This could be used by other organizations to access your [place] medical records  [id number]        Your Vitals Were     Breastfeeding? BMI (Body Mass Index)                No 28.51 kg/m2           Blood Pressure from Last 3 Encounters:   11/15/18 112/80   10/29/18 108/64   05/30/18 102/66    Weight from Last 3 Encounters:   11/15/18 174 lb (78.9 kg)   10/29/18 172 lb (78 kg)   05/30/18 166 lb (75.3 kg)              We Performed the Following     Beta HCG Qual, Urine - [facility] and [place] ([id number])     COLPOSCOPY CERVIX/UPPER VAGINA W BX CERVIX     Surgical pathology exam        Primary Care Provider Office Phone # Fax #    [facility] [phone]       [address] [address]        Equal Access to Services     NATASHA CASTRO : Hadii aad ku hadasho Soomaali, waaxda luqadaha, qaybta kaalmada adeegyada, nicanor ojeda . So [facility] [phone].    ATENCIÓN: Si habla español, tiene a knight disposición servicios gratuitos de asistencia lingüística. Llame al [phone].    We comply with applicable federal civil rights laws and [place] laws. We do not discriminate on the basis of race, color, national origin, age, disability, sex, sexual orientation, or gender identity.            Thank you!     Thank you for choosing [facility]  for your care. Our goal is always to provide you with excellent care. Hearing back from our patients is one way we can continue to improve our services. Please take a few minutes to complete the written survey that you may receive in the mail after your visit with us. Thank you!             Your Updated Medication List - Protect others around you: Learn how to safely use, store and throw away your medicines at  www.disposemymeds.org.          This list is accurate as of 11/15/18  3:00 PM.  Always use your most recent med list.                   Brand Name Dispense Instructions for use Diagnosis    norethindrone-ethinyl estradiol 1-20 MG-MCG per tablet    JUNEL FE 1/20    112 tablet    Take 1 tablet by mouth daily Skip placebo pills, takes continously    Encounter for other general counseling or advice on contraception

## 2018-11-20 LAB — COPATH REPORT: NORMAL

## 2018-11-23 NOTE — PROGRESS NOTES
Called julia and discussed the results of PAULY 2 on biopsy and need for LEEP. Discussed risks and benefits as well as aftercare. Questions answered.       Please schedule for surgery:    Patient Name:  Julia Boss ([id number]).  :  1985      Physician requests assist from:  None  Requested Dates:  Per pt availability  Schedule based on:  same  Amount of time needed for the procedure:  1hr   Expected time off from work:  1-2d if desired  Surgeon:  Courtney Ricci DO  Surgery permit to read:  Loop electrosurgical excision procedure  Preop Needed:  No  Location for surgery to performed:   In Clinic  Anesthesia:  local   No Known Allergies    DIAGNOSIS:  PAULY 2    Special instructions:  She is to take 800mg Ibuprofen prior to coming in  Please call her to schedule      Courtney Ricci DO

## 2018-11-26 ENCOUNTER — TELEPHONE (OUTPATIENT)
Dept: OBGYN | Facility: CLINIC | Age: 33
End: 2018-11-26

## 2018-11-26 NOTE — TELEPHONE ENCOUNTER
Please schedule for surgery:     Patient Name:  Julia Boss ([id number]).   :  1985       Physician requests assist from:  None   Requested Dates:  Per pt availability   Schedule based on:  same   Amount of time needed for the procedure:  1hr   Expected time off from work:  1-2d if desired   Surgeon:  Courtney Ricci DO   Surgery permit to read:  Loop electrosurgical excision procedure   Preop Needed:  No   Location for surgery to performed:   In Clinic   Anesthesia:  local    No Known Allergies     DIAGNOSIS:  PAULY 2     Special instructions:  She is to take 800mg Ibuprofen prior to coming in   Please call her to schedule       Courtney Ricci DO

## 2018-12-10 NOTE — TELEPHONE ENCOUNTER
Patient rescheduled LEEP due to insurance issues    Scheduled for 1/8/19  chk in 8:20  Procedure 8:30

## 2019-01-08 ENCOUNTER — OFFICE VISIT (OUTPATIENT)
Dept: OBGYN | Facility: CLINIC | Age: 34
End: 2019-01-08
Payer: COMMERCIAL

## 2019-01-08 VITALS
DIASTOLIC BLOOD PRESSURE: 60 MMHG | WEIGHT: 174 LBS | BODY MASS INDEX: 27.97 KG/M2 | HEIGHT: 66 IN | SYSTOLIC BLOOD PRESSURE: 116 MMHG

## 2019-01-08 DIAGNOSIS — N87.1 CIN II (CERVICAL INTRAEPITHELIAL NEOPLASIA II): Primary | ICD-10-CM

## 2019-01-08 DIAGNOSIS — R87.810 CERVICAL HIGH RISK HPV (HUMAN PAPILLOMAVIRUS) TEST POSITIVE: ICD-10-CM

## 2019-01-08 DIAGNOSIS — Z01.812 PRE-PROCEDURE LAB EXAM: ICD-10-CM

## 2019-01-08 LAB — BETA HCG QUAL IFA URINE: NEGATIVE

## 2019-01-08 PROCEDURE — 88305 TISSUE EXAM BY PATHOLOGIST: CPT | Performed by: OBSTETRICS & GYNECOLOGY

## 2019-01-08 PROCEDURE — 84703 CHORIONIC GONADOTROPIN ASSAY: CPT | Performed by: OBSTETRICS & GYNECOLOGY

## 2019-01-08 PROCEDURE — 57460 BX OF CERVIX W/SCOPE LEEP: CPT | Performed by: OBSTETRICS & GYNECOLOGY

## 2019-01-08 PROCEDURE — 88307 TISSUE EXAM BY PATHOLOGIST: CPT | Performed by: OBSTETRICS & GYNECOLOGY

## 2019-01-08 ASSESSMENT — MIFFLIN-ST. JEOR: SCORE: 1508.07

## 2019-01-08 NOTE — PROGRESS NOTES
"Procedures  33 year old  presents for LEEP.  She had a Pap on 10/18 that revealed NIL/other HR HPV+ and coloposcopic directed biopsies that revealed PAULY 2.   LEEP was recommended.  Discussed possible risks including cervical incompetence, infection, bleeding, discomfort, and possible incomplete excision of the abnormal tissue so close follow up was necessary. Consent was obtained and all questions were answered.    Today she has no complaints.   Is on OCPs, extended dosing.   Not SA    /60   Ht 1.664 m (5' 5.5\")   Wt 78.9 kg (174 lb)   BMI 28.51 kg/m       Urine Pregnancy Test: negative     Procedure: LEEP    Indication:  ___Persistent PAULY 1 __x_CIN 2 ___CIN 3 ___Positive ECC    The procedure, its risks and goals as well as complications were discussed with the patient.  She agreed to proceed.  She was placed in the dorsal lithotomy position and a coated speculum inserted into the vagina.  The cervix was exposed.  A preliminary colposcopy exam was performed using a dilute solution of acetic acid, Lugol's solution was placed on the cervix.  The cervix was injected with a solution of 1% Lidocaine with 1:200,000 epinephrine.  Following this, a LEEP of the cervix was carried out in 2 pass.  A post leep ECC was performed.  The bed of the LEEP was treated with electrocautery and Monsel's solution.  The instruments were removed.     The patient tolerated the procedurewell     33 year old  with PAULY 2 colposcopic biopsies, s/p uncomplicated LEEP .Will contact patient with pathology report and follow up plan.   Post-LEEP instructions were given to the patient.  She was told to expect heavy discharge for the first 4-7 days and could continue for 2 weeks. Nothing in the vagina and no intercourse for 4-6 weeks.  No heavy lifting for next few days.  Return for any signs of infection or heavy bleeding.    Courtney Christie Masters, DO              "

## 2019-01-10 LAB — COPATH REPORT: NORMAL

## 2019-01-11 PROBLEM — R87.810 CERVICAL HIGH RISK HPV (HUMAN PAPILLOMAVIRUS) TEST POSITIVE: Status: ACTIVE | Noted: 2017-09-21

## 2019-01-14 PROBLEM — Z98.890 S/P LEEP OF CERVIX: Status: ACTIVE | Noted: 2019-01-08

## 2019-02-04 ENCOUNTER — OFFICE VISIT (OUTPATIENT)
Dept: OBGYN | Facility: CLINIC | Age: 34
End: 2019-02-04
Payer: COMMERCIAL

## 2019-02-04 VITALS
WEIGHT: 174.8 LBS | BODY MASS INDEX: 28.65 KG/M2 | DIASTOLIC BLOOD PRESSURE: 64 MMHG | HEART RATE: 80 BPM | SYSTOLIC BLOOD PRESSURE: 92 MMHG

## 2019-02-04 DIAGNOSIS — N87.1 CIN II (CERVICAL INTRAEPITHELIAL NEOPLASIA II): ICD-10-CM

## 2019-02-04 DIAGNOSIS — Z98.890 S/P LEEP: Primary | ICD-10-CM

## 2019-02-04 PROCEDURE — 99212 OFFICE O/P EST SF 10 MIN: CPT | Performed by: OBSTETRICS & GYNECOLOGY

## 2019-02-04 NOTE — PROGRESS NOTES
SUBJECTIVE:                                                   Julia Boss is a 33 year old female who presents to clinic today for the following health issue(s):  Patient presents with:  Post-op Visit: From Leep procedure on 19. patient states no concerns after procedure.         HPI:  Doing well. Some discharge initially. No issues now.       No LMP recorded. Patient is not currently having periods (Reason: Birth Control)..   Patient is sexually active, .  Using oral contraceptives for contraception.    reports that  has never smoked. she has never used smokeless tobacco.    STD testing offered?  Declined    Health maintenance updated:  yes    Today's PHQ-2 Score:   PHQ-2 (  [employer]) 2016   Q1: Little interest or pleasure in doing things 0   Q2: Feeling down, depressed or hopeless 0   PHQ-2 Score 0     Today's PHQ-9 Score:   PHQ-9 SCORE 10/29/2018   PHQ-9 Total Score 2     Today's ASAEL-7 Score:   ASEAL-7 SCORE 10/29/2018   Total Score 5       Problem list and histories reviewed & adjusted, as indicated.  Additional history: as documented.    Patient Active Problem List   Diagnosis     Overweight     Acne, unspecified acne type     Herpes simplex vulvovaginitis     Anxiety     Moderate episode of recurrent major depressive disorder (H)     S/P LEEP of cervix with PAULY 2-3     Presence of intrauterine contraceptive device     Past Surgical History:   Procedure Laterality Date     NO HISTORY OF SURGERY        Social History     Tobacco Use     Smoking status: Never Smoker     Smokeless tobacco: Never Used   Substance Use Topics     Alcohol use: Yes     Alcohol/week: 0.0 oz     Comment: socially      Problem (# of Occurrences) Relation (Name,Age of Onset)    Thyroid Disease (1) Mother            Current Outpatient Medications   Medication Sig     norethindrone-ethinyl estradiol (JUNEL FE 1/20) 1-20 MG-MCG per tablet Take 1 tablet by mouth daily Skip placebo pills, takes continously     No  current facility-administered medications for this visit.      No Known Allergies    ROS:  12 point review of systems negative other than symptoms noted below.    OBJECTIVE:     BP 92/64 (BP Location: Right arm, Patient Position: Sitting, Cuff Size: Adult Regular)   Pulse 80   Wt 79.3 kg (174 lb 12.8 oz)   Breastfeeding? No   BMI 28.65 kg/m    Body mass index is 28.65 kg/m .    Exam:  Constitutional:  Appearance: Well nourished, well developed alert, in no acute distress  Chest:  Respiratory Effort:  Breathing unlabored  Neurologic/Psychiatric:  Mental Status:  Oriented X3   Pelvic Exam:  External Genitalia:     Normal appearance for age, no discharge present, no tenderness present, no inflammatory lesions present, color normal  Vagina:     Normal vaginal vault without central or paravaginal defects, no discharge present, no inflammatory lesions present, no masses present  Bladder:     Nontender to palpation  Urethra:   Urethral Body:  Urethra palpation normal, urethra structural support normal   Urethral Meatus:  No erythema or lesions present  Cervix:     Appearance healthy, no lesions present, nontender to palpation, no bleeding present  Perineum:     Perineum within normal limits, no evidence of trauma, no rashes or skin lesions present  Anus:     Anus within normal limits, no hemorrhoids present  Inguinal Lymph Nodes:     No lymphadenopathy present  Pubic Hair:     Normal pubic hair distribution for age  Genitalia and Groin:     No rashes present, no lesions present, no areas of discoloration, no masses present         ASSESSMENT/PLAN:                                                        ICD-10-CM    1. S/P LEEP Z98.890    2. PAULY II (cervical intraepithelial neoplasia II) N87.1        -Doing well s/p LEEP. Well healed. Reviewed path, excision complete of PAULY 2/3 tissue. F/U 1yr for annual exam and cotesting.    Courtney Christie Masters,   [facility] [place]

## 2019-10-01 ENCOUNTER — HEALTH MAINTENANCE LETTER (OUTPATIENT)
Age: 34
End: 2019-10-01

## 2019-11-25 ENCOUNTER — OFFICE VISIT (OUTPATIENT)
Dept: OBGYN | Facility: CLINIC | Age: 34
End: 2019-11-25
Payer: COMMERCIAL

## 2019-11-25 VITALS
DIASTOLIC BLOOD PRESSURE: 64 MMHG | BODY MASS INDEX: 28.49 KG/M2 | HEIGHT: 65 IN | WEIGHT: 171 LBS | SYSTOLIC BLOOD PRESSURE: 104 MMHG | HEART RATE: 74 BPM

## 2019-11-25 DIAGNOSIS — Z11.8 SCREENING FOR CHLAMYDIAL DISEASE: ICD-10-CM

## 2019-11-25 DIAGNOSIS — Z30.016 ENCOUNTER FOR INITIAL PRESCRIPTION OF TRANSDERMAL PATCH HORMONAL CONTRACEPTIVE DEVICE: ICD-10-CM

## 2019-11-25 DIAGNOSIS — Z11.3 SCREEN FOR STD (SEXUALLY TRANSMITTED DISEASE): ICD-10-CM

## 2019-11-25 DIAGNOSIS — Z01.419 ENCOUNTER FOR GYNECOLOGICAL EXAMINATION WITHOUT ABNORMAL FINDING: Primary | ICD-10-CM

## 2019-11-25 DIAGNOSIS — Z11.4 SCREENING FOR HIV (HUMAN IMMUNODEFICIENCY VIRUS): ICD-10-CM

## 2019-11-25 PROCEDURE — 87491 CHLMYD TRACH DNA AMP PROBE: CPT | Performed by: NURSE PRACTITIONER

## 2019-11-25 PROCEDURE — 87340 HEPATITIS B SURFACE AG IA: CPT | Performed by: NURSE PRACTITIONER

## 2019-11-25 PROCEDURE — 86780 TREPONEMA PALLIDUM: CPT | Performed by: NURSE PRACTITIONER

## 2019-11-25 PROCEDURE — 86695 HERPES SIMPLEX TYPE 1 TEST: CPT | Performed by: NURSE PRACTITIONER

## 2019-11-25 PROCEDURE — 87591 N.GONORRHOEAE DNA AMP PROB: CPT | Performed by: NURSE PRACTITIONER

## 2019-11-25 PROCEDURE — 86696 HERPES SIMPLEX TYPE 2 TEST: CPT | Performed by: NURSE PRACTITIONER

## 2019-11-25 PROCEDURE — G0476 HPV COMBO ASSAY CA SCREEN: HCPCS | Performed by: NURSE PRACTITIONER

## 2019-11-25 PROCEDURE — 87389 HIV-1 AG W/HIV-1&-2 AB AG IA: CPT | Performed by: NURSE PRACTITIONER

## 2019-11-25 PROCEDURE — 88175 CYTOPATH C/V AUTO FLUID REDO: CPT | Performed by: NURSE PRACTITIONER

## 2019-11-25 PROCEDURE — 36415 COLL VENOUS BLD VENIPUNCTURE: CPT | Performed by: NURSE PRACTITIONER

## 2019-11-25 PROCEDURE — 99395 PREV VISIT EST AGE 18-39: CPT | Performed by: NURSE PRACTITIONER

## 2019-11-25 PROCEDURE — 86803 HEPATITIS C AB TEST: CPT | Performed by: NURSE PRACTITIONER

## 2019-11-25 RX ORDER — NORELGESTROMIN AND ETHINYL ESTRADIOL 35; 150 UG/MG; UG/MG
1 PATCH TRANSDERMAL WEEKLY
Qty: 9 PATCH | Refills: 3 | Status: SHIPPED | OUTPATIENT
Start: 2019-11-25 | End: 2019-12-02

## 2019-11-25 ASSESSMENT — ANXIETY QUESTIONNAIRES
3. WORRYING TOO MUCH ABOUT DIFFERENT THINGS: SEVERAL DAYS
IF YOU CHECKED OFF ANY PROBLEMS ON THIS QUESTIONNAIRE, HOW DIFFICULT HAVE THESE PROBLEMS MADE IT FOR YOU TO DO YOUR WORK, TAKE CARE OF THINGS AT HOME, OR GET ALONG WITH OTHER PEOPLE: NOT DIFFICULT AT ALL
6. BECOMING EASILY ANNOYED OR IRRITABLE: SEVERAL DAYS
GAD7 TOTAL SCORE: 3
7. FEELING AFRAID AS IF SOMETHING AWFUL MIGHT HAPPEN: NOT AT ALL
5. BEING SO RESTLESS THAT IT IS HARD TO SIT STILL: NOT AT ALL
1. FEELING NERVOUS, ANXIOUS, OR ON EDGE: NOT AT ALL
2. NOT BEING ABLE TO STOP OR CONTROL WORRYING: NOT AT ALL

## 2019-11-25 ASSESSMENT — PATIENT HEALTH QUESTIONNAIRE - PHQ9
SUM OF ALL RESPONSES TO PHQ QUESTIONS 1-9: 0
5. POOR APPETITE OR OVEREATING: SEVERAL DAYS

## 2019-11-25 ASSESSMENT — MIFFLIN-ST. JEOR: SCORE: 1481.53

## 2019-11-25 NOTE — PROGRESS NOTES
Julia is a 34 year old  female who presents for annual exam.     Besides routine health maintenance, she has no other health concerns today .    HPI: here for annual exam.  She has went off her OCP's.  She states she just is not good about taking them properly.  She had a IUD in past and horrible cramps with it.  Did some research and wants to try the Ortho Evra patch, she is in a new relationship.  Uses condoms right now.    Also needs a pap. Had a leep a year ago with Dr. Ricic.  Requesting STD testing today.  No other concerns today.    The patient's PCP is  [facility].        GYNECOLOGIC HISTORY:    Patient's last menstrual period was 2019 (exact date).    Regular menses? yes  Menses every 28/30 days.  Length of menses: 5 days    Her current contraception method is: none.  She  reports that she has never smoked. She has never used smokeless tobacco.    Patient is sexually active.  STD testing offered?  Accepted  Last PHQ-9 score on record =   PHQ-9 SCORE 2019   PHQ-9 Total Score 0     Last GAD7 score on record =   ASAEL-7 SCORE 2019   Total Score 3     Alcohol Score = 3    HEALTH MAINTENANCE:  Cholesterol: (No results found for: CHOL   Last Mammo: Not applicable, Result: Not applicable, Next Mammo: Due at age 41.  Pap:   Lab Results   Component Value Date    PAP NIL, Other HPV+ 10/29/2018    PAP NIL 2017    PAP NIL 2016     Colonoscopy:  NA, Result: Not applicable, Next Colonoscopy: 17 years.  Dexa:  NA    Health maintenance updated:  yes    HISTORY:  OB History    Para Term  AB Living   0 0 0 0 0 0   SAB TAB Ectopic Multiple Live Births   0 0 0 0 0       Patient Active Problem List   Diagnosis     Overweight     Acne, unspecified acne type     Herpes simplex vulvovaginitis     Anxiety     Moderate episode of recurrent major depressive disorder (H)     S/P LEEP of cervix with PAULY 2-3     Presence of intrauterine contraceptive  "device     Past Surgical History:   Procedure Laterality Date     NO HISTORY OF SURGERY        Social History     Tobacco Use     Smoking status: Never Smoker     Smokeless tobacco: Never Used   Substance Use Topics     Alcohol use: Yes     Alcohol/week: 0.0 standard drinks     Comment: socially      Problem (# of Occurrences) Relation (Name,Age of Onset)    Thyroid Disease (1) Mother            No current outpatient medications on file.     No current facility-administered medications for this visit.      No Known Allergies    Past medical, surgical, social and family histories were reviewed and updated in EPIC.    ROS:   12 point review of systems negative other than symptoms noted below or in the HPI.  Normal menstrual cycles    EXAM:  /64   Pulse 74   Ht 1.651 m (5' 5\")   Wt 77.6 kg (171 lb)   LMP 11/02/2019 (Exact Date)   Breastfeeding No   BMI 28.46 kg/m     BMI: Body mass index is 28.46 kg/m .    PHYSICAL EXAM:  Constitutional:   Appearance: Well nourished, well developed, alert, in no acute distress  Neck:  Lymph Nodes:  No lymphadenopathy present    Thyroid:  Gland size normal, nontender, no nodules or masses present  on palpation  Chest:  Respiratory Effort:  Breathing unlabored  Cardiovascular:    Heart: Auscultation:  Regular rate, normal rhythm, no murmurs present  Breasts: Inspection of Breasts:  No lymphadenopathy present., Palpation of Breasts and Axillae:  No masses present on palpation, no breast tenderness., Axillary Lymph Nodes:  No lymphadenopathy present. and No nodularity, asymmetry or nipple discharge bilaterally.  Gastrointestinal:   Abdominal Examination:  Abdomen nontender to palpation, tone normal without rigidity or guarding, no masses present, umbilicus without lesions   Liver and Spleen:  No hepatomegaly present, liver nontender to palpation    Hernias:  No hernias present  Lymphatic: Lymph Nodes:  No other lymphadenopathy present  Skin:  General Inspection:  No rashes " present, no lesions present, no areas of  discoloration  Neurologic:    Mental Status:  Oriented X3.  Normal strength and tone, sensory exam                grossly normal, mentation intact and speech normal.    Psychiatric:   Mentation appears normal and affect normal/bright.         Pelvic Exam:  External Genitalia:     Normal appearance for age, no discharge present, no tenderness present, no inflammatory lesions present, color normal  Vagina:     Normal vaginal vault without central or paravaginal defects, no discharge present, no inflammatory lesions present, no masses present  Bladder:     Nontender to palpation  Urethra:   Urethral Body:  Urethra palpation normal, urethra structural support normal   Urethral Meatus:  No erythema or lesions present  Cervix:     Appearance healthy, no lesions present, nontender to palpation, no bleeding present chlamydia/Gc done cervically  Uterus:     Uterus: firm, normal sized and nontender, anteverted in position.   Adnexa:     No adnexal tenderness present, no adnexal masses present  Perineum:     Perineum within normal limits, no evidence of trauma, no rashes or skin lesions present  Anus:     Anus within normal limits, no hemorrhoids present  Inguinal Lymph Nodes:     No lymphadenopathy present  Pubic Hair:     Normal pubic hair distribution for age  Genitalia and Groin:     No rashes present, no lesions present, no areas of discoloration, no masses present      COUNSELING:   Reviewed preventive health counseling, as reflected in patient instructions       Regular exercise       Healthy diet/nutrition       Contraception       Safe sex practices/STD prevention    BMI: Body mass index is 28.46 kg/m .  Weight management plan: Discussed healthy diet and exercise guidelines    ASSESSMENT:  34 year old female with satisfactory annual exam.    ICD-10-CM    1. Encounter for gynecological examination without abnormal finding Z01.419 Pap imaged thin layer screen with HPV -  recommended age 30 - 65     HPV High Risk Types DNA Cervical   2. Screening for HIV (human immunodeficiency virus) Z11.4 HIV Antigen Antibody Combo   3. Screening for chlamydial disease Z11.8 CHLAMYDIA TRACHOMATIS PCR   4. Screen for STD (sexually transmitted disease) Z11.3 NEISSERIA GONORRHOEA PCR     Treponema Abs w Reflex to RPR and Titer     Herpes Simplex Virus 1 and 2 IgG     Hepatitis B Surface  Antigen     Hepatitis C Antibody       PLAN:  Normal Gyn exam. Will start ortho evra patch with next cycle.  Discussed BUM first month.  Will notify patient with lab results when available.  Return prn or 1 year for annual.    Monet Carlson, YSABEL CNP

## 2019-11-26 LAB
C TRACH DNA SPEC QL NAA+PROBE: NEGATIVE
HBV SURFACE AG SERPL QL IA: NONREACTIVE
HCV AB SERPL QL IA: NONREACTIVE
HIV 1+2 AB+HIV1 P24 AG SERPL QL IA: NONREACTIVE
HSV1 IGG SERPL QL IA: 1 AI (ref 0–0.8)
HSV2 IGG SERPL QL IA: <0.2 AI (ref 0–0.8)
N GONORRHOEA DNA SPEC QL NAA+PROBE: NEGATIVE
SPECIMEN SOURCE: NORMAL
SPECIMEN SOURCE: NORMAL
T PALLIDUM AB SER QL: NONREACTIVE

## 2019-11-26 ASSESSMENT — ANXIETY QUESTIONNAIRES: GAD7 TOTAL SCORE: 3

## 2019-11-27 NOTE — RESULT ENCOUNTER NOTE
Julia      Your results are normal, except you are positive for Type 1 herpes cold sore type.  If further questions please give me a call.    Edilma Carlson RNC

## 2019-11-29 LAB
COPATH REPORT: NORMAL
PAP: NORMAL

## 2019-12-02 ENCOUNTER — TELEPHONE (OUTPATIENT)
Dept: OBGYN | Facility: CLINIC | Age: 34
End: 2019-12-02

## 2019-12-02 DIAGNOSIS — Z30.016 ENCOUNTER FOR INITIAL PRESCRIPTION OF TRANSDERMAL PATCH HORMONAL CONTRACEPTIVE DEVICE: ICD-10-CM

## 2019-12-02 RX ORDER — NORELGESTROMIN AND ETHINYL ESTRADIOL 35; 150 UG/MG; UG/MG
PATCH TRANSDERMAL
Qty: 9 PATCH | Refills: 3 | Status: SHIPPED | OUTPATIENT
Start: 2019-12-02 | End: 2019-12-09

## 2019-12-02 NOTE — TELEPHONE ENCOUNTER
Rx sent to [facility] on [address] in [place] as requested via e-scribe.    Abena Acuña RN on 12/2/2019 at 12:03 PM

## 2019-12-02 NOTE — TELEPHONE ENCOUNTER
Patient needs rx for birth control patch sent to [facility] on [address].  She saw Edilma last week, looks like rx was printed, but pt. Never got a hard copy and would like it sent.

## 2019-12-09 ENCOUNTER — OFFICE VISIT (OUTPATIENT)
Dept: OBGYN | Facility: CLINIC | Age: 34
End: 2019-12-09
Payer: COMMERCIAL

## 2019-12-09 VITALS
WEIGHT: 170 LBS | SYSTOLIC BLOOD PRESSURE: 120 MMHG | DIASTOLIC BLOOD PRESSURE: 60 MMHG | BODY MASS INDEX: 28.29 KG/M2 | HEART RATE: 80 BPM

## 2019-12-09 DIAGNOSIS — Z30.016 ENCOUNTER FOR INITIAL PRESCRIPTION OF TRANSDERMAL PATCH HORMONAL CONTRACEPTIVE DEVICE: ICD-10-CM

## 2019-12-09 DIAGNOSIS — R87.810 CERVICAL HIGH RISK HPV (HUMAN PAPILLOMAVIRUS) TEST POSITIVE: Primary | ICD-10-CM

## 2019-12-09 DIAGNOSIS — Z01.812 PRE-PROCEDURE LAB EXAM: ICD-10-CM

## 2019-12-09 LAB — HCG UR QL: NEGATIVE

## 2019-12-09 PROCEDURE — 88305 TISSUE EXAM BY PATHOLOGIST: CPT | Performed by: OBSTETRICS & GYNECOLOGY

## 2019-12-09 PROCEDURE — 57456 ENDOCERV CURETTAGE W/SCOPE: CPT | Performed by: OBSTETRICS & GYNECOLOGY

## 2019-12-09 PROCEDURE — 81025 URINE PREGNANCY TEST: CPT | Performed by: OBSTETRICS & GYNECOLOGY

## 2019-12-09 RX ORDER — NORELGESTROMIN AND ETHINYL ESTRADIOL 35; 150 UG/MG; UG/MG
PATCH TRANSDERMAL
Qty: 9 PATCH | Refills: 3 | Status: SHIPPED | OUTPATIENT
Start: 2019-12-09 | End: 2020-08-11 | Stop reason: ALTCHOICE

## 2019-12-09 RX ORDER — NORELGESTROMIN AND ETHINYL ESTRADIOL 35; 150 UG/MG; UG/MG
1 PATCH TRANSDERMAL WEEKLY
COMMUNITY
End: 2019-12-09

## 2019-12-09 RX ORDER — NORELGESTROMIN AND ETHINYL ESTRADIOL 35; 150 UG/MG; UG/MG
PATCH TRANSDERMAL
Qty: 1 PATCH | Refills: 0 | Status: SHIPPED | OUTPATIENT
Start: 2019-12-09 | End: 2020-08-11 | Stop reason: ALTCHOICE

## 2019-12-09 RX ORDER — NORELGESTROMIN AND ETHINYL ESTRADIOL 35; 150 UG/MG; UG/MG
1 PATCH TRANSDERMAL WEEKLY
COMMUNITY
End: 2020-08-11 | Stop reason: ALTCHOICE

## 2019-12-09 NOTE — PROGRESS NOTES
INDICATIONS:                                                    Is a pregnancy test required: Yes.  Was it positive or negative?  Negative  Was a consent obtained?  Yes    Julia Boss, is a 34 year old female, who had a recent ASCUS pap.  HPV Other positive Yes prior history of abnormal pap. Here today for colposcopy. Discussed indication, risks of infection and bleeding.    Her last pap was   Lab Results   Component Value Date    PAP NIL 11/25/2019     Pap history:  2016 NIL/Oth HR+, 2017 NIL/Other HR+-->colpo benign ECC, PAULY I bx.Cotesting 1 yr. 10/18 NIL/Other HR HPV+--> PAULY 2 at 11:00-->LEEP PAULY 2/3, neg ECC.  11/19 NIL/HPV other HR+    Reports being prescribed contraceptive patch during office visit with the nurse practitioner 11/25/2019 as she became sexually active with new partner.  Her first patch she had going on came off in the bathtub shortly after placing it.  She has since placed a new patch in her arm and this is stayed put well.  Wondering about getting a replacement patch for the 1 that fell off    PROCEDURE:                                                      Speculum placed. Cervix is stained with acetic acid and viewed colposcopically. Post leep cervix noted. Squamocolumnar junction is visualized in it's entirety. no visible lesions . Biopsy done No. Endocervical curretage Done       POST PROCEDURE:                                                      IMPRESSION: Patient tolerated procedure well, colposcopy adequate and Normal cervix    PLAN : Await the results of the biopsies.  She tolerated the procedure well. There were no complications. Patient was discharged in stable condition.    Patient advised to call the clinic if excessive bleeding, pelvic pain, or fever.     Follow-up based on pathology results     Ordered 1 replacement patch.   Placed prescription for more patches to be dispensed than 1 inderjit at a time. Discussed this may be dictated otherwise by her insurance coverage.  .  Courtney Christie Masters, DO

## 2019-12-11 LAB — COPATH REPORT: NORMAL

## 2019-12-16 ENCOUNTER — TELEPHONE (OUTPATIENT)
Dept: OBGYN | Facility: CLINIC | Age: 34
End: 2019-12-16

## 2019-12-16 NOTE — TELEPHONE ENCOUNTER
Called and left a detailed vm with recommendations on receiving the flu vaccine if coming in close contact with a  baby. It is not a live virus and will protect her in the next 2 weeks for the flu season if in contact with influenza but most of all protecting the infant with the low immunity from the flu virus.    Encouraged to call with any further questions.  Abena Acuña RN on 2019 at 1:31 PM

## 2019-12-16 NOTE — TELEPHONE ENCOUNTER
Patient has questions regarding the flu shot.  States she only had 1 in the past 10 years due to her niece being in the NICU.  Now her brother is having a baby and telling her she needs to get the flu shot.  She wants to know if her doctor recommends that she get this or if she can tell her brother her doctor said it was ok not to.

## 2020-08-06 NOTE — PROGRESS NOTES
"  Julia is a 34 year old  female who presents for annual exam.     Besides routine health maintenance,  she would like to discuss birth control options.    HPI:  The patient's PCP is [facility].  Pt here today for her annual gyn exam. Hx significant for PAULY 2-3 s/p leep. colpo negative last year. Plan to repeat pap and HPV again this year as recommended by Dr. Ricci.     Pt requesting full std testing. No known exposure.     Menses are regular lasting 4 days, normal flow, regular size tampons. No cramping. Currently not using anything for contraception. Used Plan B last  and had a menses 2 weeks after her normal menses.     In the past she's taken OCP's but didn't have good compliance. Used the patch most recently, didn't like it. Had cramping with the Mary IUD. She had a friend have a stroke on NR, so she wont use that method. Since her menses are regular and \"easy\" her main concern is for contraception and acne control.       GYNECOLOGIC HISTORY:    Patient's last menstrual period was 2020 (exact date).    Regular menses? yes  Menses every 28 days.  Length of menses: 4 days    Her current contraception method is: none.  She  reports that she has never smoked. She has never used smokeless tobacco.    Patient is sexually active.  STD testing offered?  Accepted  Last PHQ-9 score on record =   PHQ-9 SCORE 2020   PHQ-9 Total Score 2     Last GAD7 score on record =   ASAEL-7 SCORE 2020   Total Score 1     Alcohol Score = 7    HEALTH MAINTENANCE:  Cholesterol: (No results found for: CHOL next year  Last Mammo: Not applicable, Result: Not applicable, Next Mammo: Due at age 41   Pap: (  Lab Results   Component Value Date    PAP NIL 2019    PAP NIL 10/29/2018    PAP NIL 2017    )  =  Colonoscopy:  Age 51, Result: Not applicable, Next Colonoscopy: age 51 years.  Dexa:  PM    Health maintenance updated:  yes    HISTORY:  OB History    Para Term " " AB Living   0 0 0 0 0 0   SAB TAB Ectopic Multiple Live Births   0 0 0 0 0       Patient Active Problem List   Diagnosis     Overweight     Acne, unspecified acne type     Herpes simplex vulvovaginitis     Anxiety     Moderate episode of recurrent major depressive disorder (H)     S/P LEEP of cervix with PAULY 2-3     Past Surgical History:   Procedure Laterality Date     NO HISTORY OF SURGERY        Social History     Tobacco Use     Smoking status: Never Smoker     Smokeless tobacco: Never Used   Substance Use Topics     Alcohol use: Yes     Alcohol/week: 0.0 standard drinks     Comment: socially      Problem (# of Occurrences) Relation (Name,Age of Onset)    Thyroid Disease (1) Mother            Current Outpatient Medications   Medication Sig     drospirenone-ethinyl estradiol (DESHAWN) 3-0.02 MG tablet Take 1 tablet by mouth daily . Active tablets only, skip placebo pills. Take continuously.     No current facility-administered medications for this visit.      No Known Allergies    Past medical, surgical, social and family histories were reviewed and updated in EPIC.    ROS:   12 point review of systems negative other than symptoms noted below or in the HPI.  No urinary frequency or dysuria, bladder or kidney problems, Normal menstrual cycles    EXAM:  /78   Ht 1.651 m (5' 5\")   Wt 78.2 kg (172 lb 6.4 oz)   LMP 2020 (Exact Date)   BMI 28.69 kg/m     BMI: Body mass index is 28.69 kg/m .    PHYSICAL EXAM:  Constitutional:   Appearance: Well nourished, well developed, alert, in no acute distress  Neck:  Lymph Nodes:  No lymphadenopathy present    Thyroid:  Gland size normal, nontender, no nodules or masses present  on palpation  Chest:  Respiratory Effort:  Breathing unlabored  Cardiovascular:    Heart: Auscultation:  Regular rate, normal rhythm, no murmurs present  Breasts: Inspection of Breasts:  No lymphadenopathy present., Palpation of Breasts and Axillae:  No masses present on palpation, no " breast tenderness., Axillary Lymph Nodes:  No lymphadenopathy present. and No nodularity, asymmetry or nipple discharge bilaterally.  Gastrointestinal:   Abdominal Examination:  Abdomen nontender to palpation, tone normal without rigidity or guarding, no masses present, umbilicus without lesions   Liver and Spleen:  No hepatomegaly present, liver nontender to palpation    Hernias:  No hernias present  Lymphatic: Lymph Nodes:  No other lymphadenopathy present  Skin:  General Inspection:  No rashes present, no lesions present, no areas of  discoloration  Neurologic:    Mental Status:  Oriented X3.  Normal strength and tone, sensory exam                grossly normal, mentation intact and speech normal.    Psychiatric:   Mentation appears normal and affect normal/bright.         Pelvic Exam:  External Genitalia:     Normal appearance for age, no discharge present, no tenderness present, no inflammatory lesions present, color normal  Vagina:     Normal vaginal vault without central or paravaginal defects, no discharge present, no inflammatory lesions present, no masses present  Bladder:     Nontender to palpation  Urethra:   Urethral Body:  Urethra palpation normal, urethra structural support normal   Urethral Meatus:  No erythema or lesions present  Cervix:     Appearance healthy, no lesions present, nontender to palpation, no bleeding present  Uterus:     Uterus: firm, normal sized and nontender, midplane in position.   Adnexa:     No adnexal tenderness present, no adnexal masses present  Perineum:     Perineum within normal limits, no evidence of trauma, no rashes or skin lesions present  Anus:     Anus within normal limits, no hemorrhoids present  Inguinal Lymph Nodes:     No lymphadenopathy present  Pubic Hair:     Normal pubic hair distribution for age  Genitalia and Groin:     No rashes present, no lesions present, no areas of discoloration, no masses present      COUNSELING:   Special attention given to:         Regular exercise       Healthy diet/nutrition       Contraception       Safe sex practices/STD prevention    BMI: Body mass index is 28.69 kg/m .  Weight management plan: Discussed healthy diet and exercise guidelines    ASSESSMENT:  34 year old female with satisfactory annual exam.    ICD-10-CM    1. Encounter for gynecological examination without abnormal finding  Z01.419    2. Acne, unspecified acne type  L70.9 drospirenone-ethinyl estradiol (DESHAWN) 3-0.02 MG tablet   3. S/P LEEP of cervix with PAULY 2-3  Z98.890 Pap imaged thin layer screen with HPV - recommended age 30 - 65 years (select HPV order below)     HPV High Risk Types DNA Cervical   4. Screening for thyroid disorder  Z13.29 TSH with free T4 reflex   5. Screen for sexually transmitted diseases  Z11.3 NEISSERIA GONORRHOEA PCR     Treponema Abs w Reflex to RPR and Titer     Herpes Simplex Virus 1 and 2 IgG     HIV Antigen Antibody Combo   6. Special screening examination for chlamydial disease  Z11.8 CHLAMYDIA TRACHOMATIS PCR       PLAN:  Pap updated. F/u as appropriate. Screen for thyroid due to fam hx. Full std testing done. Recommended she try deshawn. M/R/B discussed.    YSABEL Baum CNP

## 2020-08-11 ENCOUNTER — OFFICE VISIT (OUTPATIENT)
Dept: OBGYN | Facility: CLINIC | Age: 35
End: 2020-08-11
Payer: COMMERCIAL

## 2020-08-11 VITALS
WEIGHT: 172.4 LBS | BODY MASS INDEX: 28.72 KG/M2 | HEIGHT: 65 IN | SYSTOLIC BLOOD PRESSURE: 110 MMHG | DIASTOLIC BLOOD PRESSURE: 78 MMHG

## 2020-08-11 DIAGNOSIS — Z11.3 SCREEN FOR SEXUALLY TRANSMITTED DISEASES: ICD-10-CM

## 2020-08-11 DIAGNOSIS — Z11.8 SPECIAL SCREENING EXAMINATION FOR CHLAMYDIAL DISEASE: ICD-10-CM

## 2020-08-11 DIAGNOSIS — Z13.29 SCREENING FOR THYROID DISORDER: ICD-10-CM

## 2020-08-11 DIAGNOSIS — Z01.419 ENCOUNTER FOR GYNECOLOGICAL EXAMINATION WITHOUT ABNORMAL FINDING: Primary | ICD-10-CM

## 2020-08-11 DIAGNOSIS — Z98.890 S/P LEEP OF CERVIX: ICD-10-CM

## 2020-08-11 DIAGNOSIS — L70.9 ACNE, UNSPECIFIED ACNE TYPE: ICD-10-CM

## 2020-08-11 PROBLEM — Z97.5 PRESENCE OF INTRAUTERINE CONTRACEPTIVE DEVICE: Status: RESOLVED | Noted: 2017-10-20 | Resolved: 2020-08-11

## 2020-08-11 PROCEDURE — 87591 N.GONORRHOEAE DNA AMP PROB: CPT | Performed by: NURSE PRACTITIONER

## 2020-08-11 PROCEDURE — 86780 TREPONEMA PALLIDUM: CPT | Performed by: NURSE PRACTITIONER

## 2020-08-11 PROCEDURE — 86696 HERPES SIMPLEX TYPE 2 TEST: CPT | Performed by: NURSE PRACTITIONER

## 2020-08-11 PROCEDURE — 99395 PREV VISIT EST AGE 18-39: CPT | Performed by: NURSE PRACTITIONER

## 2020-08-11 PROCEDURE — 87491 CHLMYD TRACH DNA AMP PROBE: CPT | Performed by: NURSE PRACTITIONER

## 2020-08-11 PROCEDURE — 84443 ASSAY THYROID STIM HORMONE: CPT | Performed by: NURSE PRACTITIONER

## 2020-08-11 PROCEDURE — 87624 HPV HI-RISK TYP POOLED RSLT: CPT | Performed by: NURSE PRACTITIONER

## 2020-08-11 PROCEDURE — 86695 HERPES SIMPLEX TYPE 1 TEST: CPT | Performed by: NURSE PRACTITIONER

## 2020-08-11 PROCEDURE — G0145 SCR C/V CYTO,THINLAYER,RESCR: HCPCS | Performed by: NURSE PRACTITIONER

## 2020-08-11 PROCEDURE — 36415 COLL VENOUS BLD VENIPUNCTURE: CPT | Performed by: NURSE PRACTITIONER

## 2020-08-11 PROCEDURE — 87389 HIV-1 AG W/HIV-1&-2 AB AG IA: CPT | Performed by: NURSE PRACTITIONER

## 2020-08-11 RX ORDER — DROSPIRENONE AND ETHINYL ESTRADIOL 0.02-3(28)
1 KIT ORAL DAILY
Qty: 112 TABLET | Refills: 4 | Status: SHIPPED | OUTPATIENT
Start: 2020-08-11 | End: 2021-08-27

## 2020-08-11 ASSESSMENT — ANXIETY QUESTIONNAIRES
2. NOT BEING ABLE TO STOP OR CONTROL WORRYING: NOT AT ALL
GAD7 TOTAL SCORE: 1
5. BEING SO RESTLESS THAT IT IS HARD TO SIT STILL: NOT AT ALL
3. WORRYING TOO MUCH ABOUT DIFFERENT THINGS: NOT AT ALL
7. FEELING AFRAID AS IF SOMETHING AWFUL MIGHT HAPPEN: NOT AT ALL
6. BECOMING EASILY ANNOYED OR IRRITABLE: SEVERAL DAYS
IF YOU CHECKED OFF ANY PROBLEMS ON THIS QUESTIONNAIRE, HOW DIFFICULT HAVE THESE PROBLEMS MADE IT FOR YOU TO DO YOUR WORK, TAKE CARE OF THINGS AT HOME, OR GET ALONG WITH OTHER PEOPLE: NOT DIFFICULT AT ALL
1. FEELING NERVOUS, ANXIOUS, OR ON EDGE: NOT AT ALL

## 2020-08-11 ASSESSMENT — PATIENT HEALTH QUESTIONNAIRE - PHQ9
SUM OF ALL RESPONSES TO PHQ QUESTIONS 1-9: 2
5. POOR APPETITE OR OVEREATING: NOT AT ALL

## 2020-08-11 ASSESSMENT — MIFFLIN-ST. JEOR: SCORE: 1487.88

## 2020-08-12 LAB
C TRACH DNA SPEC QL NAA+PROBE: NEGATIVE
HIV 1+2 AB+HIV1 P24 AG SERPL QL IA: NONREACTIVE
HSV1 IGG SERPL QL IA: >8 AI (ref 0–0.8)
HSV2 IGG SERPL QL IA: <0.2 AI (ref 0–0.8)
N GONORRHOEA DNA SPEC QL NAA+PROBE: NEGATIVE
SPECIMEN SOURCE: NORMAL
SPECIMEN SOURCE: NORMAL
T PALLIDUM AB SER QL: NONREACTIVE
TSH SERPL DL<=0.005 MIU/L-ACNC: 1 MU/L (ref 0.4–4)

## 2020-08-12 ASSESSMENT — ANXIETY QUESTIONNAIRES: GAD7 TOTAL SCORE: 1

## 2020-08-13 LAB
COPATH REPORT: NORMAL
PAP: NORMAL

## 2020-08-14 LAB
FINAL DIAGNOSIS: ABNORMAL
HPV HR 12 DNA CVX QL NAA+PROBE: POSITIVE
HPV16 DNA SPEC QL NAA+PROBE: POSITIVE
HPV18 DNA SPEC QL NAA+PROBE: NEGATIVE
SPECIMEN DESCRIPTION: ABNORMAL
SPECIMEN SOURCE CVX/VAG CYTO: ABNORMAL

## 2020-08-17 ENCOUNTER — PATIENT OUTREACH (OUTPATIENT)
Dept: OBGYN | Facility: CLINIC | Age: 35
End: 2020-08-17

## 2020-08-17 DIAGNOSIS — Z98.890 S/P LEEP OF CERVIX: ICD-10-CM

## 2020-08-17 NOTE — TELEPHONE ENCOUNTER
8/31/16 NIL/+ HR HPV (not 16/18).   9/21/17 NIL/+ HR HPV (not 16/18). Plan: colpo  10/20/17 Colpo.  Bx-1:00-PAULY 1, 7:00-benign. Ecc-benign . Plan: cotest in 1 year  10/29/18  NIL/+ HR HPV (not 16/18). Plan: colpo  11/15/18 Colpo: Bx- PAULY II. Plan: LEEP  1/8/19 LEEP - PAULY II- III. ECC-neg. Neg Margins. Plan: cotest in 1 year  11/25/19 NIL pap, + HR HPV (not 16/18). Plan: colpo  12/9/19 colpo: ECC negative. Plan: cotest in 1 year  8/11/20 NIL pap, +HR HPV 16 & other. Plan: colposcopy, due 11/11/20 8/17/20 pt notified

## 2020-08-31 ENCOUNTER — OFFICE VISIT (OUTPATIENT)
Dept: OBGYN | Facility: CLINIC | Age: 35
End: 2020-08-31
Payer: COMMERCIAL

## 2020-08-31 VITALS
DIASTOLIC BLOOD PRESSURE: 68 MMHG | BODY MASS INDEX: 29.16 KG/M2 | HEIGHT: 65 IN | SYSTOLIC BLOOD PRESSURE: 102 MMHG | WEIGHT: 175 LBS

## 2020-08-31 DIAGNOSIS — R87.810 CERVICAL HIGH RISK HPV (HUMAN PAPILLOMAVIRUS) TEST POSITIVE: Primary | ICD-10-CM

## 2020-08-31 DIAGNOSIS — Z01.812 PRE-PROCEDURE LAB EXAM: ICD-10-CM

## 2020-08-31 LAB — HCG UR QL: NEGATIVE

## 2020-08-31 PROCEDURE — 81025 URINE PREGNANCY TEST: CPT | Performed by: OBSTETRICS & GYNECOLOGY

## 2020-08-31 PROCEDURE — 57456 ENDOCERV CURETTAGE W/SCOPE: CPT | Performed by: OBSTETRICS & GYNECOLOGY

## 2020-08-31 ASSESSMENT — MIFFLIN-ST. JEOR: SCORE: 1499.67

## 2020-08-31 NOTE — PROGRESS NOTES
INDICATIONS:                                                      Julia Boss, is a 34 year old female, who had a recent NEGATIVE pap  16 positive  Patient has prior history of abnormal pap see History Overview.     Here today for colposcopy. Discussed indication, risks of infection and bleeding.    Her last pap was   Lab Results   Component Value Date    PAP NIL 08/11/2020     Is a pregnancy test required: Yes.  Was it positive or negative?  Negative  Was a consent obtained?  No    HISTORY OVERVIEW  8/31/16 NIL/+ HR HPV (not 16/18).   9/21/17 NIL/+ HR HPV (not 16/18). Plan: colpo  10/20/17 Colpo.  Bx-1:00-PAULY 1, 7:00-benign. Ecc-benign . Plan: cotest in 1 year  10/29/18  NIL/+ HR HPV (not 16/18). Plan: colpo  11/15/18 Colpo: Bx- PAULY II. Plan: LEEP  1/8/19 LEEP - PAULY II- III. ECC-neg. Neg Margins. Plan: cotest in 1 year  11/25/19 NIL pap, + HR HPV (not 16/18). Plan: colpo  12/9/19 colpo: ECC negative. Plan: cotest in 1 year  8/11/20 NIL pap, +HR HPV 16 & other. Plan: colposcopy    PROCEDURE:                                                      Cervix is stained with acetic acid and viewed colposcopically. Squamocolumnar junction is visualized in it's entirety. no visible lesions . Biopsy done No. Endocervical curretage Done         POST PROCEDURE:                                                      IMPRESSION: Patient tolerated procedure well, colposcopy adequate and Normal cervix    PLAN : Await the results of the biopsies.  She tolerated the procedure well. There were no complications. Patient was discharged in stable condition.    Patient advised to call the clinic if excessive bleeding, pelvic pain, or fever.     Follow-up based on pathology results   .  Courtney Christie Masters, DO

## 2020-09-03 PROCEDURE — 88305 TISSUE EXAM BY PATHOLOGIST: CPT | Performed by: OBSTETRICS & GYNECOLOGY

## 2020-09-08 LAB — COPATH REPORT: NORMAL

## 2020-09-11 ENCOUNTER — PATIENT OUTREACH (OUTPATIENT)
Dept: OBGYN | Facility: CLINIC | Age: 35
End: 2020-09-11

## 2020-09-11 NOTE — TELEPHONE ENCOUNTER
8/31/16 NIL/+ HR HPV (not 16/18).   9/21/17 NIL/+ HR HPV (not 16/18). Plan: colpo  10/20/17 Colpo.  Bx-1:00-PAULY 1, 7:00-benign. Ecc-benign . Plan: cotest in 1 year  10/29/18  NIL/+ HR HPV (not 16/18). Plan: colpo  11/15/18 Colpo: Bx- PAULY II. Plan: LEEP  1/8/19 LEEP - PAULY II- III. ECC-neg. Neg Margins. Plan: cotest in 1 year  11/25/19 NIL pap, + HR HPV (not 16/18). Plan: colpo  12/9/19 colpo: ECC negative. Plan: cotest in 1 year  8/11/20 NIL pap, +HR HPV 16 & other. Plan: colposcopy, due 11/11/20 8/17/20 pt notified  8/31/20 colpo ECC neg. Plan: cotest in 1 year

## 2020-11-03 ENCOUNTER — TELEPHONE (OUTPATIENT)
Dept: OBGYN | Facility: CLINIC | Age: 35
End: 2020-11-03

## 2020-11-03 DIAGNOSIS — Z01.419 ENCOUNTER FOR GYNECOLOGICAL EXAMINATION WITHOUT ABNORMAL FINDING: Primary | ICD-10-CM

## 2020-11-03 NOTE — TELEPHONE ENCOUNTER
Called pt back and left detailed msg to come into clinic to leave a urine sample.  Since Dr. Ricci is out of the office today, put in order for UA to determine if UTI.    Routing to provider as FYI for pending lab.    Di Doe RN on 11/3/2020 at 11:15 AM

## 2020-11-03 NOTE — TELEPHONE ENCOUNTER
Patient believes she has a UTI and would like to know if she has to be seen to get medication.  Please give her a call back.    Thank you

## 2020-11-05 NOTE — TELEPHONE ENCOUNTER
Called and left a detailed vm informing to make evisit or phone visit. UC is also an option as well.  Abena Acuña RN on 11/5/2020 at 9:12 AM

## 2020-11-05 NOTE — TELEPHONE ENCOUNTER
This is an evist or a phone visit type of encounter. Please inform pt to initiate either of these.    Courtney Christie Masters, DO

## 2020-11-12 NOTE — TELEPHONE ENCOUNTER
Patient calling back to report she was seen [facility] and had treatment.  FYI only no call back needed.

## 2021-01-15 ENCOUNTER — HEALTH MAINTENANCE LETTER (OUTPATIENT)
Age: 36
End: 2021-01-15

## 2021-02-11 ENCOUNTER — TELEPHONE (OUTPATIENT)
Dept: OBGYN | Facility: CLINIC | Age: 36
End: 2021-02-11

## 2021-02-11 NOTE — TELEPHONE ENCOUNTER
Started DESHAWN in August, takes continuously, no placebo    Last month and half consistently getting clumpy discharge, dark brown, clumps are black-esteban.    Not enough to stain underwear, but present when she goes to the bathroom and noticeable during intercourse.  (Experienced same symptoms when IUD was removed a few years ago.)    Doesn't wear a pad, but discharge does has not stopped.    First 4 months of DESHAWN had normal discharge, this is new.    Pt asking if body wants to have a period?  Should she take the placebos this month or stay on the course of continuously.  Really likes not having a period, but this discharge for last 1-2 months makes her feel self conscience with boyfriend.  Doesn't want to stop DESHAWN or change, just wondering what she can do.    Is going to work, will not be accessible the rest of the day..... ok to leave message or respond via [facility] [employer].    Routing to provider for review/recommendations.    Di Doe RN on 2/11/2021 at 10:43 AM

## 2021-02-12 NOTE — TELEPHONE ENCOUNTER
Breakthrough bleeding such as this is not uncommon for continuous OCP usage. Often, to help this one can plan to have a period every 3-4 months-take the placebo week on the 3rd or 4th pack, then repeat the process for next 3 or 4 packs.   So, she can stop for 5 days, then restart her pills. She can use condoms for back up for next 3 weeks.    Courtney Christie Masters, DO

## 2021-02-12 NOTE — TELEPHONE ENCOUNTER
Pt advised and discussed. She is going to take a break and allow a period and restart. Not concerned about contraception at this time.    Pt verbalized understanding, in agreement with plan, and voiced no further questions.  Abena Acuña RN on 2/12/2021 at 8:49 AM

## 2021-02-26 ENCOUNTER — TELEPHONE (OUTPATIENT)
Dept: OBGYN | Facility: CLINIC | Age: 36
End: 2021-02-26

## 2021-02-26 NOTE — PROGRESS NOTES
SUBJECTIVE:                                                   Julia Boss is a 35 year old female who presents to clinic today for the following health issue(s):  Patient presents with:  Vaginal Problem      Additional information: vaginal irritation and cysitic irritation    HPI: Patient is c/o vaginal bump after shaving.  She also noted she has cystic acne she is on anais and usually does continous but was spotting so went off for a week and got cystic acne on chin right away  Also shaved in vaginal area and now has noticed 2 bumps, one is dissolving.      Patient's last menstrual period was 2021 (approximate)..     Patient is sexually active, .  Using oral contraceptives for contraception.    reports that she has never smoked. She has never used smokeless tobacco.    STD testing offered?  Declined    Health maintenance updated:  no    Today's PHQ-2 Score:   PHQ-2 (  [employer]) 2019   Q1: Little interest or pleasure in doing things 0   Q2: Feeling down, depressed or hopeless 0   PHQ-2 Score 0     Today's PHQ-9 Score:   PHQ-9 SCORE 2020   PHQ-9 Total Score 2     Today's ASAEL-7 Score:   ASAEL-7 SCORE 2020   Total Score 1       Problem list and histories reviewed & adjusted, as indicated.  Additional history: as documented.    Patient Active Problem List   Diagnosis     Overweight     Acne, unspecified acne type     Herpes simplex vulvovaginitis     Anxiety     Moderate episode of recurrent major depressive disorder (H)     S/P LEEP of cervix with PAULY 2-3     Past Surgical History:   Procedure Laterality Date     NO HISTORY OF SURGERY        Social History     Tobacco Use     Smoking status: Never Smoker     Smokeless tobacco: Never Used   Substance Use Topics     Alcohol use: Yes     Alcohol/week: 0.0 standard drinks     Comment: socially      Problem (# of Occurrences) Relation (Name,Age of Onset)    Thyroid Disease (1) Mother            Current Outpatient Medications  "  Medication Sig     drospirenone-ethinyl estradiol (DESHAWN) 3-0.02 MG tablet Take 1 tablet by mouth daily . Active tablets only, skip placebo pills. Take continuously.     No current facility-administered medications for this visit.      No Known Allergies    ROS:  12 point review of systems negative other than symptoms noted below or in the HPI.  Vaginal bumps      OBJECTIVE:     BP 90/58   Ht 1.664 m (5' 5.5\")   Wt 76.4 kg (168 lb 6.4 oz)   LMP 02/20/2021 (Approximate)   Breastfeeding No   BMI 27.60 kg/m    Body mass index is 27.6 kg/m .    Exam:  Constitutional:  Appearance: Well nourished, well developed alert, in no acute distress  Neurologic:  Mental Status:  Oriented X3.  Normal strength and tone, sensory exam grossly normal, mentation intact and speech normal.    Psychiatric:  Mentation appears normal and affect normal/bright.  Pelvic Exam:  External Genitalia:     Normal appearance for age, no discharge present, no tenderness present,  present, color normal              A small ingrown hair noted on right labia that is healing and also one noted on left inside labia up at very top that is not red or irritated.  Can see core.  Not tender to patient at this time.  Vagina:     Normal vaginal vault without central or paravaginal defects, no discharge present, no inflammatory lesions present, no masses present  Bladder:     Nontender to palpation  Urethra:   Urethral Body:  Urethra palpation normal, urethra structural support normal   Urethral Meatus:  No erythema or lesions present  Perineum:     Perineum within normal limits, no evidence of trauma, no rashes or skin lesions present  Anus:     Anus within normal limits, no hemorrhoids present  Inguinal Lymph Nodes:     No lymphadenopathy present  Pubic Hair:     Normal pubic hair distribution for age  Genitalia and Groin:     No rashes present, no lesions present, no areas of discoloration, no masses present       In-Clinic Test Results:  No results found " for this or any previous visit (from the past 24 hour(s)).    ASSESSMENT/PLAN:                                                        ICD-10-CM    1. Folliculitis  L73.9          Patient to hot pack area 2-3 times a day. Also recommended patient see a dermatologist about cystic acne and possible also doing spirolactone. Recommendations given.  Continue on anais.  Return if persists or prn.    YSABEL Bobby [facility] FOR [facility]

## 2021-02-26 NOTE — TELEPHONE ENCOUNTER
Believes she has a bartholin's cyst (researched on internet)    Bump in vaginal area.  Has had cystic acne, but not in her vaginal area.  Asked pt if she has squeezed it and she stated she has a difficult time not squeezing.    Recommended pt make an OV to have it evaluated.  Pt in agreement.    Transferred call to scheduling.  Appt 3/1/21 with Edilma Doe RN on 2/26/2021 at 11:30 AM

## 2021-03-01 ENCOUNTER — OFFICE VISIT (OUTPATIENT)
Dept: OBGYN | Facility: CLINIC | Age: 36
End: 2021-03-01
Payer: COMMERCIAL

## 2021-03-01 VITALS
BODY MASS INDEX: 27.06 KG/M2 | DIASTOLIC BLOOD PRESSURE: 58 MMHG | SYSTOLIC BLOOD PRESSURE: 90 MMHG | WEIGHT: 168.4 LBS | HEIGHT: 66 IN

## 2021-03-01 DIAGNOSIS — L73.9 FOLLICULITIS: Primary | ICD-10-CM

## 2021-03-01 PROCEDURE — 99212 OFFICE O/P EST SF 10 MIN: CPT | Performed by: NURSE PRACTITIONER

## 2021-03-01 ASSESSMENT — MIFFLIN-ST. JEOR: SCORE: 1472.67

## 2021-08-26 NOTE — PROGRESS NOTES
Julia is a 35 year old  female who presents for annual exam.     Besides routine health maintenance,  she would like to discuss Fertility.    HPI:  The patient's PCP is  [facility].      Using continuous OCPs. Had had some spotting so now going to q 4mo extended dosing. Wants refill.   Uses spironolactone when has her period.     Wondering about fertility. Sister just had a baby. She never really wanted babies. BF doesn't want more.         GYNECOLOGIC HISTORY:    No LMP recorded. (Menstrual status: Birth Control).    Regular menses? BC  Menses every NA days.  Length of menses: NA days    Her current contraception method is: oral contraceptives.  She  reports that she has never smoked. She has never used smokeless tobacco.    Patient is sexually active.  STD testing offered?  Accepted  Last PHQ-9 score on record =   PHQ-9 SCORE 2021   PHQ-9 Total Score 0     Last GAD7 score on record =   ASAEL-7 SCORE 2021   Total Score 0     Alcohol Score = 3    HEALTH MAINTENANCE:  Cholesterol: (No results found for: CHOL   Last Mammo: Not applicable, Result: Not applicable, Next Mammo: Due at age 41   Pap: (  Lab Results   Component Value Date    PAP NIL Other HR HPV,HPV 16 DNA 2020    PAP NILOther HR HPV 2019    PAP NIL 10/29/2018      Colonoscopy:  NEVER, Result: Not applicable, Next Colonoscopy: Due at age 51 years.  Dexa:  never    Health maintenance updated:  yes    HISTORY:  OB History    Para Term  AB Living   0 0 0 0 0 0   SAB TAB Ectopic Multiple Live Births   0 0 0 0 0       Patient Active Problem List   Diagnosis     Overweight     Acne, unspecified acne type     Herpes simplex vulvovaginitis     Anxiety     Moderate episode of recurrent major depressive disorder (H)     S/P LEEP of cervix with PAULY 2-3     Past Surgical History:   Procedure Laterality Date     NO HISTORY OF SURGERY        Social History     Tobacco Use     Smoking status: Never  Smoker     Smokeless tobacco: Never Used   Substance Use Topics     Alcohol use: Yes     Alcohol/week: 0.0 standard drinks     Comment: socially      Problem (# of Occurrences) Relation (Name,Age of Onset)    Thyroid Disease (1) Mother            Current Outpatient Medications   Medication Sig     drospirenone-ethinyl estradiol (DESHAWN) 3-0.02 MG tablet Take 1 tablet by mouth daily . Active tablets only, skip placebo pills. Take continuously.     No current facility-administered medications for this visit.     No Known Allergies    Past medical, surgical, social and family histories were reviewed and updated in EPIC.    ROS:   12 point review of systems negative other than symptoms noted below or in the HPI.  No urinary frequency or dysuria, bladder or kidney problems    EXAM:  /64   Pulse 68   Wt 73.9 kg (163 lb)   BMI 26.71 kg/m     BMI: Body mass index is 26.71 kg/m .    PHYSICAL EXAM:  Constitutional:   Appearance: Well nourished, well developed, alert, in no acute distress  Neck:  Lymph Nodes:  No lymphadenopathy present    Thyroid:  Gland size normal, nontender, no nodules or masses present  on palpation  Chest:  Respiratory Effort:  Breathing unlabored  Cardiovascular:    Heart: Auscultation:  Regular rate, normal rhythm, no murmurs present  Breasts: Inspection of Breasts:  No lymphadenopathy present., Palpation of Breasts and Axillae:  No masses present on palpation, no breast tenderness., Axillary Lymph Nodes:  No lymphadenopathy present. and No nodularity, asymmetry or nipple discharge bilaterally.  Gastrointestinal:   Abdominal Examination:  Abdomen nontender to palpation, tone normal without rigidity or guarding, no masses present, umbilicus without lesions   Liver and Spleen:  No hepatomegaly present, liver nontender to palpation    Hernias:  No hernias present  Lymphatic: Lymph Nodes:  No other lymphadenopathy present  Skin:  General Inspection:  No rashes present, no lesions present, no areas of   discoloration  Neurologic:    Mental Status:  Oriented X3.  Normal strength and tone, sensory exam                grossly normal, mentation intact and speech normal.    Psychiatric:   Mentation appears normal and affect normal/bright.         Pelvic Exam:  External Genitalia:     Normal appearance for age, no discharge present, no tenderness present, no inflammatory lesions present, color normal  Vagina:     Normal vaginal vault without central or paravaginal defects, no discharge present, no inflammatory lesions present, no masses present  Bladder:     Nontender to palpation  Urethra:   Urethral Body:  Urethra palpation normal, urethra structural support normal   Urethral Meatus:  No erythema or lesions present  Cervix:     Appearance healthy, no lesions present, nontender to palpation, no bleeding present  Uterus:     Uterus: firm, normal sized and nontender, midplane in position.   Adnexa:     No adnexal tenderness present, no adnexal masses present  Perineum:     Perineum within normal limits, no evidence of trauma, no rashes or skin lesions present  Anus:     Anus within normal limits, no hemorrhoids present  Inguinal Lymph Nodes:     No lymphadenopathy present  Pubic Hair:     Normal pubic hair distribution for age  Genitalia and Groin:     No rashes present, no lesions present, no areas of discoloration, no masses present      COUNSELING:   Reviewed preventive health counseling, as reflected in patient instructions       Osteoporosis prevention/bone health    BMI: Body mass index is 26.71 kg/m .      ASSESSMENT:  35 year old female with satisfactory annual exam.    ICD-10-CM    1. Encounter for gynecological examination without abnormal finding  Z01.419 Pap thin layer screen with HPV - recommended age 30 - 65 years     Pap thin layer screen with HPV - recommended age 30 - 65 years     HPV Hold (Lab Only)     HPV High Risk Types DNA Cervical   2. Screen for STD (sexually transmitted disease)  Z11.3 NEISSERIA  GONORRHOEA PCR     NEISSERIA GONORRHOEA PCR   3. Screening for chlamydial disease  Z11.8 CHLAMYDIA TRACHOMATIS PCR     CHLAMYDIA TRACHOMATIS PCR   4. Acne, unspecified acne type  L70.9 drospirenone-ethinyl estradiol (DESHAWN) 3-0.02 MG tablet       PLAN:  -Pap/hpv obtained for cervical cancer screening. Reviewed guidelines-pap q 3yrs until age 30 when co-testing q 5 years.  -Breast self awareness discussed. Age 40 for mammogram.  -Discussed exercise-making plan, strength training. Nutrition encouraged.  -Colonoscopy age 45  -Osteoporosis prevention discussed.  -Desires STI labs  -Refill OCP. Doing well.   -Return one year for next annual exam          Courtney Christie Masters, DO

## 2021-08-27 ENCOUNTER — OFFICE VISIT (OUTPATIENT)
Dept: OBGYN | Facility: CLINIC | Age: 36
End: 2021-08-27
Payer: COMMERCIAL

## 2021-08-27 VITALS
SYSTOLIC BLOOD PRESSURE: 118 MMHG | HEART RATE: 68 BPM | DIASTOLIC BLOOD PRESSURE: 64 MMHG | BODY MASS INDEX: 26.71 KG/M2 | WEIGHT: 163 LBS

## 2021-08-27 DIAGNOSIS — Z01.419 ENCOUNTER FOR GYNECOLOGICAL EXAMINATION WITHOUT ABNORMAL FINDING: Primary | ICD-10-CM

## 2021-08-27 DIAGNOSIS — L70.9 ACNE, UNSPECIFIED ACNE TYPE: ICD-10-CM

## 2021-08-27 DIAGNOSIS — Z11.8 SCREENING FOR CHLAMYDIAL DISEASE: ICD-10-CM

## 2021-08-27 DIAGNOSIS — Z11.3 SCREEN FOR STD (SEXUALLY TRANSMITTED DISEASE): ICD-10-CM

## 2021-08-27 PROCEDURE — G0124 SCREEN C/V THIN LAYER BY MD: HCPCS | Performed by: PATHOLOGY

## 2021-08-27 PROCEDURE — 99395 PREV VISIT EST AGE 18-39: CPT | Performed by: OBSTETRICS & GYNECOLOGY

## 2021-08-27 PROCEDURE — 87591 N.GONORRHOEAE DNA AMP PROB: CPT | Performed by: OBSTETRICS & GYNECOLOGY

## 2021-08-27 PROCEDURE — 87624 HPV HI-RISK TYP POOLED RSLT: CPT | Performed by: OBSTETRICS & GYNECOLOGY

## 2021-08-27 PROCEDURE — 87491 CHLMYD TRACH DNA AMP PROBE: CPT | Performed by: OBSTETRICS & GYNECOLOGY

## 2021-08-27 PROCEDURE — G0145 SCR C/V CYTO,THINLAYER,RESCR: HCPCS | Performed by: OBSTETRICS & GYNECOLOGY

## 2021-08-27 RX ORDER — DROSPIRENONE AND ETHINYL ESTRADIOL 0.02-3(28)
1 KIT ORAL DAILY
Qty: 112 TABLET | Refills: 4 | Status: SHIPPED | OUTPATIENT
Start: 2021-08-27 | End: 2022-08-29

## 2021-08-27 ASSESSMENT — ANXIETY QUESTIONNAIRES
IF YOU CHECKED OFF ANY PROBLEMS ON THIS QUESTIONNAIRE, HOW DIFFICULT HAVE THESE PROBLEMS MADE IT FOR YOU TO DO YOUR WORK, TAKE CARE OF THINGS AT HOME, OR GET ALONG WITH OTHER PEOPLE: NOT DIFFICULT AT ALL
5. BEING SO RESTLESS THAT IT IS HARD TO SIT STILL: NOT AT ALL
3. WORRYING TOO MUCH ABOUT DIFFERENT THINGS: NOT AT ALL
1. FEELING NERVOUS, ANXIOUS, OR ON EDGE: NOT AT ALL
6. BECOMING EASILY ANNOYED OR IRRITABLE: NOT AT ALL
7. FEELING AFRAID AS IF SOMETHING AWFUL MIGHT HAPPEN: NOT AT ALL
2. NOT BEING ABLE TO STOP OR CONTROL WORRYING: NOT AT ALL
GAD7 TOTAL SCORE: 0

## 2021-08-27 ASSESSMENT — PATIENT HEALTH QUESTIONNAIRE - PHQ9
SUM OF ALL RESPONSES TO PHQ QUESTIONS 1-9: 0
5. POOR APPETITE OR OVEREATING: NOT AT ALL

## 2021-08-27 NOTE — PATIENT INSTRUCTIONS
-Daily total calcium intake (between food/supplements) should be 1000mg which equates to 3-4 servings calcium containing food per day; VItamin D 1000IU.   Foods rich in calcium are: milk, cheese, yogurt, seafood, sardines and canned salmon, leafy green vegetables such as karo greens, spinach and kale, beans and lentils, almonds, seeds (poppy, sesame, celery, yvonne), rhubarb, dried fruit such as figs, whey protein, tofu and edamame, amaranth, other foods with added calcium such as orange juice and some cereals.   If adequate amount not taken in diet, then a supplement may be needed.     -I also recommend increasing your dietary fiber by starting Metamucil (powder mixed in glass of water) twice daily

## 2021-08-28 LAB
C TRACH DNA SPEC QL NAA+PROBE: NEGATIVE
N GONORRHOEA DNA SPEC QL NAA+PROBE: NEGATIVE

## 2021-08-28 ASSESSMENT — ANXIETY QUESTIONNAIRES: GAD7 TOTAL SCORE: 0

## 2021-09-02 LAB
BKR LAB AP GYN ADEQUACY: ABNORMAL
BKR LAB AP GYN INTERPRETATION: ABNORMAL
BKR LAB AP HPV REFLEX: ABNORMAL
BKR LAB AP PREVIOUS ABNL DX: ABNORMAL
BKR LAB AP PREVIOUS ABNORMAL: ABNORMAL
PATH REPORT.COMMENTS IMP SPEC: ABNORMAL
PATH REPORT.RELEVANT HX SPEC: ABNORMAL

## 2021-09-03 ENCOUNTER — PATIENT OUTREACH (OUTPATIENT)
Dept: OBGYN | Facility: CLINIC | Age: 36
End: 2021-09-03

## 2021-09-03 DIAGNOSIS — Z98.890 S/P LEEP OF CERVIX: ICD-10-CM

## 2021-09-03 LAB
HUMAN PAPILLOMA VIRUS 16 DNA: NEGATIVE
HUMAN PAPILLOMA VIRUS 18 DNA: NEGATIVE
HUMAN PAPILLOMA VIRUS FINAL DIAGNOSIS: ABNORMAL
HUMAN PAPILLOMA VIRUS OTHER HR: POSITIVE

## 2021-09-04 ENCOUNTER — HEALTH MAINTENANCE LETTER (OUTPATIENT)
Age: 36
End: 2021-09-04

## 2021-10-13 NOTE — PROGRESS NOTES
INDICATIONS:                                                    Is a pregnancy test required: Yes.  Was it positive or negative?  Negative  Was a consent obtained?  Yes    Julia Boss, is a 35 year old female, who had a recent ASCUS pap.  HPV Other positive Yes prior history of abnormal pap. Here today for colposcopy. Discussed indication, risks of infection and bleeding.    8/31/16 NIL/+ HR HPV (not 16/18).   9/21/17 NIL/+ HR HPV (not 16/18). Plan: colpo  10/20/17 Colpo.  Bx-1:00-PAULY 1, 7:00-benign. Ecc-benign . Plan: cotest in 1 year  10/29/18  NIL/+ HR HPV (not 16/18). Plan: colpo  11/15/18 Colpo: Bx- PAULY II. Plan: LEEP  1/8/19 LEEP - PAULY II- III. ECC-neg. Neg Margins. Plan: cotest in 1 year  11/25/19 NIL pap, + HR HPV (not 16/18). Plan: colpo  12/9/19 colpo: ECC negative. Plan: cotest in 1 year  8/11/20 NIL pap, +HR HPV 16 & other. Plan: colposcopy, due 11/11/20 8/17/20 pt notified  8/31/20 colpo ECC neg. Plan: cotest in 1 year  8/27/21 ASCUS, +HR HPV, not 16/18    PROCEDURE:                                                      Cervix is stained with acetic acid and viewed colposcopically. Squamocolumnar junction is visualized in it's entirety. Mild acetowhite lesion(s) noted at 12 o'clock . Biopsy done Yes. Endocervical curretage Done         POST PROCEDURE:                                                      IMPRESSION: Patient tolerated procedure well, colposcopy adequate, normal cervix to PAULY 1 impression    PLAN : Await the results of the biopsies.  She tolerated the procedure well. There were no complications. Patient was discharged in stable condition.    Patient advised to call the clinic if excessive bleeding, pelvic pain, or fever.     Follow-up based on pathology results.  Courtney Christie Masters, DO

## 2021-10-14 ENCOUNTER — OFFICE VISIT (OUTPATIENT)
Dept: OBGYN | Facility: CLINIC | Age: 36
End: 2021-10-14
Payer: COMMERCIAL

## 2021-10-14 VITALS
DIASTOLIC BLOOD PRESSURE: 60 MMHG | BODY MASS INDEX: 26.81 KG/M2 | HEART RATE: 68 BPM | SYSTOLIC BLOOD PRESSURE: 104 MMHG | WEIGHT: 166.8 LBS | HEIGHT: 66 IN

## 2021-10-14 DIAGNOSIS — R87.810 ASCUS WITH POSITIVE HIGH RISK HPV CERVICAL: ICD-10-CM

## 2021-10-14 DIAGNOSIS — R87.610 ASCUS WITH POSITIVE HIGH RISK HPV CERVICAL: ICD-10-CM

## 2021-10-14 DIAGNOSIS — Z01.812 PRE-PROCEDURE LAB EXAM: Primary | ICD-10-CM

## 2021-10-14 LAB — HCG UR QL: NEGATIVE

## 2021-10-14 PROCEDURE — 57454 BX/CURETT OF CERVIX W/SCOPE: CPT | Performed by: OBSTETRICS & GYNECOLOGY

## 2021-10-14 PROCEDURE — 81025 URINE PREGNANCY TEST: CPT | Performed by: OBSTETRICS & GYNECOLOGY

## 2021-10-14 PROCEDURE — 88305 TISSUE EXAM BY PATHOLOGIST: CPT | Performed by: PATHOLOGY

## 2021-10-14 ASSESSMENT — MIFFLIN-ST. JEOR: SCORE: 1465.41

## 2021-10-18 LAB
PATH REPORT.COMMENTS IMP SPEC: NORMAL
PATH REPORT.COMMENTS IMP SPEC: NORMAL
PATH REPORT.FINAL DX SPEC: NORMAL
PATH REPORT.GROSS SPEC: NORMAL
PATH REPORT.MICROSCOPIC SPEC OTHER STN: NORMAL
PATH REPORT.RELEVANT HX SPEC: NORMAL
PHOTO IMAGE: NORMAL

## 2021-10-21 ENCOUNTER — PATIENT OUTREACH (OUTPATIENT)
Dept: OBGYN | Facility: CLINIC | Age: 36
End: 2021-10-21

## 2021-10-31 DIAGNOSIS — L70.9 ACNE, UNSPECIFIED ACNE TYPE: ICD-10-CM

## 2021-11-01 RX ORDER — DROSPIRENONE AND ETHINYL ESTRADIOL 0.02-3(28)
KIT ORAL
Qty: 112 TABLET | Refills: 4 | OUTPATIENT
Start: 2021-11-01

## 2021-11-01 NOTE — TELEPHONE ENCOUNTER
"Requested Prescriptions   Pending Prescriptions Disp Refills     drospirenone-ethinyl estradiol (DESHAWN) 3-0.02 MG tablet [Pharmacy Med Name: DROSPIRENONE/ETHY EST 3/0.02MG T 28] 112 tablet 4     Sig: TAKE ONE TABLET BY MOUTH DAILY. TAKE ACTIVE TABLETS ONLY.       Contraceptives Protocol Passed - 10/31/2021  4:36 AM        Passed - Patient is not a current smoker if age is 35 or older        Passed - Recent (12 mo) or future (30 days) visit within the authorizing provider's specialty     Patient has had an office visit with the authorizing provider or a provider within the authorizing providers department within the previous 12 mos or has a future within next 30 days. See \"Patient Info\" tab in inbasket, or \"Choose Columns\" in Meds & Orders section of the refill encounter.              Passed - Medication is active on med list        Passed - No active pregnancy on record        Passed - No positive pregnancy test in past 12 months           Last Written Prescription Date:  8/27/21  Last Fill Quantity: 112,  # refills: 4   Last office visit: 10/14/2021 with prescribing provider:  Keiry   Future Office Visit:  none    Refills available  Le Buitrago RN on 11/1/2021 at 1:24 PM        "

## 2022-05-23 NOTE — PROGRESS NOTES
"  Assessment & Plan     Poor concentration  Julia would benefit from meeting with psychologist for ADHD testing to ensure that this is the correct diagnosis.  Today, we discussed interventions for ADHD including stimulants. I agreed to start a low dose of Adderall at this time as a trial since testing is scheduling several months out.  Uses and SE discussed. She will follow up in 3 months for f/u  - Adult Mental Health  Referral; Future  - amphetamine-dextroamphetamine (ADDERALL) 5 MG tablet; Take 1 tablet (5 mg) by mouth 2 times daily    Moderate episode of recurrent major depressive disorder (H)       BMI:   Estimated body mass index is 28.46 kg/m  as calculated from the following:    Height as of this encounter: 1.651 m (5' 5\").    Weight as of this encounter: 77.6 kg (171 lb).       Return in about 3 months (around 8/24/2022) for med check.    Curly Alfonso PA-C  [facility] OBED Dumont is a 36 year old who presents for the following health issues     History of Present Illness       Reason for visit:  ADHD  Symptom onset:  More than a month  Symptoms include:  Distraction, trouble focusing  Symptom intensity:  Moderate  Symptom progression:  Staying the same  Had these symptoms before:  Yes  Has tried/received treatment for these symptoms:  No  What makes it worse:  No  What makes it better:  No    She eats 0-1 servings of fruits and vegetables daily.She consumes 1 sweetened beverage(s) daily.She exercises with enough effort to increase her heart rate 10 to 19 minutes per day.  She exercises with enough effort to increase her heart rate 3 or less days per week.   She is taking medications regularly.    Today's PHQ-9         PHQ-9 Total Score: 6    PHQ-9 Q9 Thoughts of better off dead/self-harm past 2 weeks :   Not at all    How difficult have these problems made it for you to do your work, take care of things at home, or get along with other people: " "Somewhat difficult       Julia is a new patient to our clinic today.  She has a PMH depression.  She has been struggling to focus for several years.She has been trying to write a book recently and finds that she is unable to focus when she tries to write during the day  She performed well in school for the most part, but finishing tasks and focus have always been a struggle for her.  She has tried Wellbutrin in the past for depression, this caused her depression to worsen.She has no hx of anxiety.   She has not been diagnosed with ADHD in the past    Review of Systems   Constitutional, HEENT, cardiovascular, pulmonary, gi and gu systems are negative, except as otherwise noted.      Objective    /83   Pulse 84   Temp 98.1  F (36.7  C) (Tympanic)   Ht 1.651 m (5' 5\")   Wt 77.6 kg (171 lb)   SpO2 98%   BMI 28.46 kg/m    Body mass index is 28.46 kg/m .  Physical Exam   GENERAL: healthy, alert and no distress  PSYCH: mentation appears normal, affect normal/bright            "

## 2022-05-24 ENCOUNTER — OFFICE VISIT (OUTPATIENT)
Dept: FAMILY MEDICINE | Facility: CLINIC | Age: 37
End: 2022-05-24
Payer: COMMERCIAL

## 2022-05-24 VITALS
TEMPERATURE: 98.1 F | WEIGHT: 171 LBS | DIASTOLIC BLOOD PRESSURE: 83 MMHG | OXYGEN SATURATION: 98 % | BODY MASS INDEX: 28.49 KG/M2 | HEIGHT: 65 IN | HEART RATE: 84 BPM | SYSTOLIC BLOOD PRESSURE: 113 MMHG

## 2022-05-24 DIAGNOSIS — R41.840 POOR CONCENTRATION: Primary | ICD-10-CM

## 2022-05-24 DIAGNOSIS — F33.1 MODERATE EPISODE OF RECURRENT MAJOR DEPRESSIVE DISORDER (H): ICD-10-CM

## 2022-05-24 PROCEDURE — 99204 OFFICE O/P NEW MOD 45 MIN: CPT | Performed by: PHYSICIAN ASSISTANT

## 2022-05-24 PROCEDURE — 96127 BRIEF EMOTIONAL/BEHAV ASSMT: CPT | Performed by: PHYSICIAN ASSISTANT

## 2022-05-24 ASSESSMENT — PAIN SCALES - GENERAL: PAINLEVEL: NO PAIN (0)

## 2022-05-24 ASSESSMENT — PATIENT HEALTH QUESTIONNAIRE - PHQ9
SUM OF ALL RESPONSES TO PHQ QUESTIONS 1-9: 6
10. IF YOU CHECKED OFF ANY PROBLEMS, HOW DIFFICULT HAVE THESE PROBLEMS MADE IT FOR YOU TO DO YOUR WORK, TAKE CARE OF THINGS AT HOME, OR GET ALONG WITH OTHER PEOPLE: SOMEWHAT DIFFICULT
SUM OF ALL RESPONSES TO PHQ QUESTIONS 1-9: 6

## 2022-05-25 ENCOUNTER — TELEPHONE (OUTPATIENT)
Dept: FAMILY MEDICINE | Facility: CLINIC | Age: 37
End: 2022-05-25
Payer: COMMERCIAL

## 2022-05-25 RX ORDER — DEXTROAMPHETAMINE SACCHARATE, AMPHETAMINE ASPARTATE, DEXTROAMPHETAMINE SULFATE AND AMPHETAMINE SULFATE 1.25; 1.25; 1.25; 1.25 MG/1; MG/1; MG/1; MG/1
5 TABLET ORAL 2 TIMES DAILY
Qty: 60 TABLET | Refills: 0 | Status: SHIPPED | OUTPATIENT
Start: 2022-05-25 | End: 2023-01-31

## 2022-05-25 NOTE — TELEPHONE ENCOUNTER
Pt calling and is frustrated because she waited in line for 30 minutes at her [facility] pharmacy to  her Adderall prescription today following her appt with Curlyleticia Alfonso on 5/24/22 only to be told that there was no medication for her. Curly's office visit from 5/24 has unsigned orders. Routing to providers to assist.    Pt is leaving [facility] tomorrow (5/26) and is requesting her medications be sent by today or tomorrow morning.     Team to call pt when refill is sent. Ok to leave detailed message.    Laine Sanchez,  [facility]

## 2022-08-26 NOTE — PROGRESS NOTES
Julia is a 36 year old  female who presents for annual exam.     Besides routine health maintenance, she has no other health concerns today .    HPI:  The patient's PCP is  [facility].    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         GYNECOLOGIC HISTORY:    No LMP recorded. (Menstrual status: Birth Control).    Regular menses? no  Menses every BC days.  Length of menses: NA days    Her current contraception method is: oral contraceptives.  She  reports that she has never smoked. She has never used smokeless tobacco.    Patient is sexually active.  STD testing offered?  Accepted  Last PHQ-9 score on record =   PHQ-9 SCORE 2022   PHQ-9 Total Score MyChart -   PHQ-9 Total Score 3     Last GAD7 score on record =   ASAEL-7 SCORE 2022   Total Score 0     Alcohol Score = 3    HEALTH MAINTENANCE:  Cholesterol: (No results found for: CHOL   Last Mammo: Not applicable, Result: Not applicable, Next Mammo: Due at age 40   Pap: (  Lab Results   Component Value Date    GYNINTERP  Other HR HPV+ 2021     Atypical squamous cells of undetermined significance (ASC-US)    PAP NIL 2020    PAP NIL 2019    PAP NIL 10/29/2018     Colonoscopy:  never, Result: Not applicable, Next Colonoscopy: due at age 45-50 years.  Dexa:  never    Health maintenance updated:  yes    HISTORY:  OB History    Para Term  AB Living   0 0 0 0 0 0   SAB IAB Ectopic Multiple Live Births   0 0 0 0 0       Patient Active Problem List   Diagnosis     Overweight     Acne, unspecified acne type     Herpes simplex vulvovaginitis     Anxiety     Moderate episode of recurrent major depressive disorder (H)     S/P LEEP of cervix with PAULY 2-3     Past Surgical History:   Procedure Laterality Date     NO HISTORY OF SURGERY        Social History     Tobacco Use     Smoking status: Never Smoker      "Smokeless tobacco: Never Used   Substance Use Topics     Alcohol use: Yes     Alcohol/week: 0.0 standard drinks     Comment: socially      Problem (# of Occurrences) Relation (Name,Age of Onset)    No Known Problems (8) Father, Sister, Brother, Maternal Grandmother, Maternal Grandfather, Paternal Grandmother, Paternal Grandfather, Other    Thyroid Disease (1) Mother            Current Outpatient Medications   Medication Sig     drospirenone-ethinyl estradiol (DESHAWN) 3-0.02 MG tablet Take 1 tablet by mouth daily . Active tablets only, skip placebo pills. Take continuously.     amphetamine-dextroamphetamine (ADDERALL) 5 MG tablet Take 1 tablet (5 mg) by mouth 2 times daily (Patient not taking: Reported on 8/29/2022)     mometasone (ELOCON) 0.1 % external cream APPLY TO IRRITATED AREAS OF THE BODY ONCE DAILY AS NEEDED     No current facility-administered medications for this visit.     No Known Allergies    Past medical, surgical, social and family histories were reviewed and updated in EPIC.    ROS:   12 point review of systems negative other than symptoms noted below or in the HPI.  No urinary frequency or dysuria, bladder or kidney problems    EXAM:  /66   Pulse 64   Ht 1.676 m (5' 6\")   Wt 75.3 kg (166 lb)   BMI 26.79 kg/m     BMI: Body mass index is 26.79 kg/m .    PHYSICAL EXAM:  Constitutional:   Appearance: Well nourished, well developed, alert, in no acute distress  Neck:  Lymph Nodes:  No lymphadenopathy present    Thyroid:  Gland size normal, nontender, no nodules or masses present  on palpation  Chest:  Respiratory Effort:  Breathing unlabored  Cardiovascular:    Heart: Auscultation:  Regular rate, normal rhythm, no murmurs present  Breasts: Inspection of Breasts:  No lymphadenopathy present., Palpation of Breasts and Axillae:  No masses present on palpation, no breast tenderness., Axillary Lymph Nodes:  No lymphadenopathy present. and No nodularity, asymmetry or nipple discharge " bilaterally.  Gastrointestinal:   Abdominal Examination:  Abdomen nontender to palpation, tone normal without rigidity or guarding, no masses present, umbilicus without lesions   Liver and Spleen:  No hepatomegaly present, liver nontender to palpation    Hernias:  No hernias present  Lymphatic: Lymph Nodes:  No other lymphadenopathy present  Skin:  General Inspection:  No rashes present, no lesions present, no areas of  discoloration  Neurologic:    Mental Status:  Oriented X3.  Normal strength and tone, sensory exam                grossly normal, mentation intact and speech normal.    Psychiatric:   Mentation appears normal and affect normal/bright.         Pelvic Exam:  External Genitalia:     Normal appearance for age, no discharge present, no tenderness present, no inflammatory lesions present, color normal  Vagina:     Normal vaginal vault without central or paravaginal defects, no discharge present, no inflammatory lesions present, no masses present  Bladder:     Nontender to palpation  Urethra:   Urethral Body:  Urethra palpation normal, urethra structural support normal   Urethral Meatus:  No erythema or lesions present  Cervix:     Appearance healthy, no lesions present, nontender to palpation, no bleeding present  Uterus:     Uterus: firm, normal sized and nontender, midplane in position.   Adnexa:     No adnexal tenderness present, no adnexal masses present  Perineum:     Perineum within normal limits, no evidence of trauma, no rashes or skin lesions present  Anus:     Anus within normal limits, no hemorrhoids present  Inguinal Lymph Nodes:     No lymphadenopathy present  Pubic Hair:     Normal pubic hair distribution for age  Genitalia and Groin:     No rashes present, no lesions present, no areas of discoloration, no masses present      COUNSELING:   Reviewed preventive health counseling, as reflected in patient instructions       Osteoporosis prevention/bone health    BMI: Body mass index is 26.79  kg/m .      ASSESSMENT:  36 year old female with satisfactory annual exam.    ICD-10-CM    1. Encounter for gynecological examination without abnormal finding  Z01.419 Pap thin layer screen with HPV - recommended age 30 - 65 years     NEISSERIA GONORRHOEA PCR     Treponema Abs w Reflex to RPR and Titer     Treponema Abs w Reflex to RPR and Titer   2. Cervical high risk HPV (human papillomavirus) test positive  R87.810    3. Acne, unspecified acne type  L70.9 drospirenone-ethinyl estradiol (DESHAWN) 3-0.02 MG tablet   4. Screen for STD (sexually transmitted disease)  Z11.3 CHLAMYDIA TRACHOMATIS PCR   5. Screening for chlamydial disease  Z11.8    6. Encounter for screening for HIV  Z11.4 HIV Antigen Antibody Combo     HIV Antigen Antibody Combo   7. Need for hepatitis B screening test  Z11.59 Hepatitis B Surface  Antigen     Hepatitis B Surface  Antigen   8. Need for hepatitis C screening test  Z11.59 Hepatitis C Antibody     Hepatitis C Antibody   9. Encounter for vitamin deficiency screening  Z13.21 Vitamin D deficiency screening     Vitamin D deficiency screening       PLAN:  -Pap/hpv obtained for cervical cancer screening. Hx HPV, PAULY 1. F/u per guideines  -Breast self awareness discussed.   -Will perform vit d screen.   UTD other metabolic labs.  Hx thyroid abnl in fam, hers was normal 2020  -Desires STI labs  -REfill OCP doing well. No contraindication identified  -Return one year for next annual exam          Courtney Christie Masters, DO

## 2022-08-29 ENCOUNTER — OFFICE VISIT (OUTPATIENT)
Dept: OBGYN | Facility: CLINIC | Age: 37
End: 2022-08-29
Payer: COMMERCIAL

## 2022-08-29 VITALS
SYSTOLIC BLOOD PRESSURE: 110 MMHG | HEIGHT: 66 IN | HEART RATE: 64 BPM | BODY MASS INDEX: 26.68 KG/M2 | WEIGHT: 166 LBS | DIASTOLIC BLOOD PRESSURE: 66 MMHG

## 2022-08-29 DIAGNOSIS — Z13.21 ENCOUNTER FOR VITAMIN DEFICIENCY SCREENING: ICD-10-CM

## 2022-08-29 DIAGNOSIS — Z11.8 SCREENING FOR CHLAMYDIAL DISEASE: ICD-10-CM

## 2022-08-29 DIAGNOSIS — Z11.3 SCREEN FOR STD (SEXUALLY TRANSMITTED DISEASE): ICD-10-CM

## 2022-08-29 DIAGNOSIS — Z01.419 ENCOUNTER FOR GYNECOLOGICAL EXAMINATION WITHOUT ABNORMAL FINDING: Primary | ICD-10-CM

## 2022-08-29 DIAGNOSIS — Z11.59 NEED FOR HEPATITIS C SCREENING TEST: ICD-10-CM

## 2022-08-29 DIAGNOSIS — R87.810 CERVICAL HIGH RISK HPV (HUMAN PAPILLOMAVIRUS) TEST POSITIVE: ICD-10-CM

## 2022-08-29 DIAGNOSIS — Z11.4 ENCOUNTER FOR SCREENING FOR HIV: ICD-10-CM

## 2022-08-29 DIAGNOSIS — Z11.59 NEED FOR HEPATITIS B SCREENING TEST: ICD-10-CM

## 2022-08-29 DIAGNOSIS — L70.9 ACNE, UNSPECIFIED ACNE TYPE: ICD-10-CM

## 2022-08-29 PROCEDURE — G0145 SCR C/V CYTO,THINLAYER,RESCR: HCPCS | Performed by: OBSTETRICS & GYNECOLOGY

## 2022-08-29 PROCEDURE — 99395 PREV VISIT EST AGE 18-39: CPT | Performed by: OBSTETRICS & GYNECOLOGY

## 2022-08-29 PROCEDURE — 87491 CHLMYD TRACH DNA AMP PROBE: CPT | Performed by: OBSTETRICS & GYNECOLOGY

## 2022-08-29 PROCEDURE — 87389 HIV-1 AG W/HIV-1&-2 AB AG IA: CPT | Performed by: OBSTETRICS & GYNECOLOGY

## 2022-08-29 PROCEDURE — G0124 SCREEN C/V THIN LAYER BY MD: HCPCS | Performed by: PATHOLOGY

## 2022-08-29 PROCEDURE — 82306 VITAMIN D 25 HYDROXY: CPT | Performed by: OBSTETRICS & GYNECOLOGY

## 2022-08-29 PROCEDURE — 87624 HPV HI-RISK TYP POOLED RSLT: CPT | Performed by: OBSTETRICS & GYNECOLOGY

## 2022-08-29 PROCEDURE — 36415 COLL VENOUS BLD VENIPUNCTURE: CPT | Performed by: OBSTETRICS & GYNECOLOGY

## 2022-08-29 PROCEDURE — 86780 TREPONEMA PALLIDUM: CPT | Performed by: OBSTETRICS & GYNECOLOGY

## 2022-08-29 PROCEDURE — 87591 N.GONORRHOEAE DNA AMP PROB: CPT | Performed by: OBSTETRICS & GYNECOLOGY

## 2022-08-29 PROCEDURE — 86803 HEPATITIS C AB TEST: CPT | Performed by: OBSTETRICS & GYNECOLOGY

## 2022-08-29 PROCEDURE — 87340 HEPATITIS B SURFACE AG IA: CPT | Performed by: OBSTETRICS & GYNECOLOGY

## 2022-08-29 RX ORDER — MOMETASONE FUROATE 1 MG/G
CREAM TOPICAL
COMMUNITY
Start: 2022-06-15 | End: 2023-01-31

## 2022-08-29 RX ORDER — DROSPIRENONE AND ETHINYL ESTRADIOL 0.02-3(28)
1 KIT ORAL DAILY
Qty: 112 TABLET | Refills: 4 | Status: SHIPPED | OUTPATIENT
Start: 2022-08-29 | End: 2023-11-06

## 2022-08-29 ASSESSMENT — ANXIETY QUESTIONNAIRES
IF YOU CHECKED OFF ANY PROBLEMS ON THIS QUESTIONNAIRE, HOW DIFFICULT HAVE THESE PROBLEMS MADE IT FOR YOU TO DO YOUR WORK, TAKE CARE OF THINGS AT HOME, OR GET ALONG WITH OTHER PEOPLE: NOT DIFFICULT AT ALL
1. FEELING NERVOUS, ANXIOUS, OR ON EDGE: NOT AT ALL
5. BEING SO RESTLESS THAT IT IS HARD TO SIT STILL: NOT AT ALL
2. NOT BEING ABLE TO STOP OR CONTROL WORRYING: NOT AT ALL
6. BECOMING EASILY ANNOYED OR IRRITABLE: NOT AT ALL
7. FEELING AFRAID AS IF SOMETHING AWFUL MIGHT HAPPEN: NOT AT ALL
GAD7 TOTAL SCORE: 0
3. WORRYING TOO MUCH ABOUT DIFFERENT THINGS: NOT AT ALL
GAD7 TOTAL SCORE: 0

## 2022-08-29 ASSESSMENT — PATIENT HEALTH QUESTIONNAIRE - PHQ9
5. POOR APPETITE OR OVEREATING: NOT AT ALL
SUM OF ALL RESPONSES TO PHQ QUESTIONS 1-9: 3

## 2022-08-30 LAB
C TRACH DNA SPEC QL NAA+PROBE: NEGATIVE
DEPRECATED CALCIDIOL+CALCIFEROL SERPL-MC: 48 UG/L (ref 20–75)
HBV SURFACE AG SERPL QL IA: NONREACTIVE
HCV AB SERPL QL IA: NONREACTIVE
HIV 1+2 AB+HIV1 P24 AG SERPL QL IA: NONREACTIVE
N GONORRHOEA DNA SPEC QL NAA+PROBE: NEGATIVE
T PALLIDUM AB SER QL: NONREACTIVE

## 2022-08-30 NOTE — PATIENT INSTRUCTIONS
-Daily total calcium intake (between food/supplements) should be 1000mg which equates to 3-4 servings calcium containing food per day; VItamin D 1000IU.   Foods rich in calcium are: milk, cheese, yogurt, seafood, sardines and canned salmon, leafy green vegetables such as karo greens, spinach and kale, beans and lentils, almonds, seeds (poppy, sesame, celery, yvonne), rhubarb, dried fruit such as figs, whey protein, tofu and edamame, amaranth, other foods with added calcium such as orange juice and some cereals.   If adequate amount not taken in diet, then a supplement may be needed.     -I also recommend increasing your dietary fiber by starting Metamucil (powder mixed in glass of water) once to twice daily

## 2022-09-02 LAB
BKR LAB AP GYN ADEQUACY: ABNORMAL
BKR LAB AP GYN INTERPRETATION: ABNORMAL
BKR LAB AP HPV REFLEX: ABNORMAL
BKR LAB AP PREVIOUS ABNL DX: ABNORMAL
BKR LAB AP PREVIOUS ABNORMAL: ABNORMAL
PATH REPORT.COMMENTS IMP SPEC: ABNORMAL
PATH REPORT.COMMENTS IMP SPEC: ABNORMAL
PATH REPORT.RELEVANT HX SPEC: ABNORMAL

## 2022-09-07 ENCOUNTER — PATIENT OUTREACH (OUTPATIENT)
Dept: OBGYN | Facility: CLINIC | Age: 37
End: 2022-09-07

## 2022-09-07 DIAGNOSIS — Z98.890 S/P LEEP OF CERVIX: ICD-10-CM

## 2022-10-16 ENCOUNTER — HEALTH MAINTENANCE LETTER (OUTPATIENT)
Age: 37
End: 2022-10-16

## 2022-10-26 DIAGNOSIS — Z01.812 PRE-PROCEDURE LAB EXAM: Primary | ICD-10-CM

## 2022-11-02 NOTE — PROGRESS NOTES
INDICATIONS:                                                      Julia Boss, is a 37 year old female, who had a recent ABNORMAL pap. Patient has prior history of abnormal pap. Here today for colposcopy. Discussed indication, risks of infection and bleeding.    Her last pap was   Lab Results   Component Value Date    PAP ASCUS, +HR-HPV OTHER 08/29/2022    PAP NIL, +HPV 16, + OTHER 08/11/2020     HISTORY OVERVIEW  8/31/16 NIL/+ HR HPV (not 16/18).   9/21/17 NIL/+ HR HPV (not 16/18). Plan: colpo  10/20/17 Colpo.  Bx-1:00-PAULY 1, 7:00-benign. Ecc-benign . Plan: cotest in 1 year  10/29/18  NIL/+ HR HPV (not 16/18). Plan: colpo  11/15/18 Colpo: Bx- PAULY II. Plan: LEEP  1/8/19 LEEP - PAULY II- III. ECC-neg. Neg Margins. Plan: cotest in 1 year  11/25/19 NIL pap, + HR HPV (not 16/18). Plan: colpo  12/9/19 colpo: ECC negative. Plan: cotest in 1 year  8/11/20 NIL pap, +HR HPV 16 & other. Plan: colposcopy  8/31/20 colpo ECC neg. Plan: cotest in 1 year  8/27/21 ASCUS, +HR HPV, not 16/18. Plan Colpo  10/14/21 [place]- PAULY 1. Plan cotest in 1 year  8/29/22 ASCUS pap, + HR HPV (not 16/18). Plan colpo    Is a pregnancy test required: Yes.  Was it positive or negative?  Negative  Was a consent obtained?  Yes          PROCEDURE:                                                      Cervix is stained with acetic acid and viewed colposcopically. Post LEEP cervix. Squamocolumnar junction is visualized in it's entirety. no visible lesions . Biopsy done ECC/bx at 12. Endocervical curretage Done       POST PROCEDURE:                                                      IMPRESSION: Patient tolerated procedure well, colposcopy adequate and Normal cervix    PLAN : Await the results of the biopsies.  She tolerated the procedure well. There were no complications. Patient was discharged in stable condition.    Patient advised to call the clinic if excessive bleeding, pelvic pain, or fever.     Follow-up based on pathology results.  Courtney  Shahnaz Masters, DO

## 2022-11-03 ENCOUNTER — OFFICE VISIT (OUTPATIENT)
Dept: OBGYN | Facility: CLINIC | Age: 37
End: 2022-11-03
Payer: COMMERCIAL

## 2022-11-03 ENCOUNTER — LAB (OUTPATIENT)
Dept: LAB | Facility: CLINIC | Age: 37
End: 2022-11-03
Payer: COMMERCIAL

## 2022-11-03 VITALS — BODY MASS INDEX: 27.57 KG/M2 | WEIGHT: 170.8 LBS | SYSTOLIC BLOOD PRESSURE: 100 MMHG | DIASTOLIC BLOOD PRESSURE: 70 MMHG

## 2022-11-03 DIAGNOSIS — Z23 NEED FOR PROPHYLACTIC VACCINATION AND INOCULATION AGAINST INFLUENZA: ICD-10-CM

## 2022-11-03 DIAGNOSIS — Z01.812 PRE-PROCEDURE LAB EXAM: ICD-10-CM

## 2022-11-03 DIAGNOSIS — R87.610 ASCUS WITH POSITIVE HIGH RISK HPV CERVICAL: Primary | ICD-10-CM

## 2022-11-03 DIAGNOSIS — R87.810 ASCUS WITH POSITIVE HIGH RISK HPV CERVICAL: Primary | ICD-10-CM

## 2022-11-03 LAB — HCG UR QL: NEGATIVE

## 2022-11-03 PROCEDURE — 57454 BX/CURETT OF CERVIX W/SCOPE: CPT | Performed by: OBSTETRICS & GYNECOLOGY

## 2022-11-03 PROCEDURE — 81025 URINE PREGNANCY TEST: CPT

## 2022-11-03 PROCEDURE — 90686 IIV4 VACC NO PRSV 0.5 ML IM: CPT | Performed by: OBSTETRICS & GYNECOLOGY

## 2022-11-03 PROCEDURE — 90471 IMMUNIZATION ADMIN: CPT | Performed by: OBSTETRICS & GYNECOLOGY

## 2022-11-03 PROCEDURE — 88305 TISSUE EXAM BY PATHOLOGIST: CPT | Performed by: PATHOLOGY

## 2022-11-03 RX ORDER — HYDROCORTISONE VALERATE CREAM 2 MG/G
CREAM TOPICAL
COMMUNITY
Start: 2022-06-15 | End: 2023-01-31

## 2022-11-07 ASSESSMENT — PATIENT HEALTH QUESTIONNAIRE - PHQ9
SUM OF ALL RESPONSES TO PHQ QUESTIONS 1-9: 1
SUM OF ALL RESPONSES TO PHQ QUESTIONS 1-9: 1

## 2022-11-08 ENCOUNTER — VIRTUAL VISIT (OUTPATIENT)
Dept: PSYCHOLOGY | Facility: CLINIC | Age: 37
End: 2022-11-08
Attending: PHYSICIAN ASSISTANT
Payer: COMMERCIAL

## 2022-11-08 DIAGNOSIS — F41.9 ANXIETY: Primary | ICD-10-CM

## 2022-11-08 PROCEDURE — 90791 PSYCH DIAGNOSTIC EVALUATION: CPT | Mod: 95 | Performed by: PSYCHOLOGIST

## 2022-11-08 ASSESSMENT — ANXIETY QUESTIONNAIRES
1. FEELING NERVOUS, ANXIOUS, OR ON EDGE: NOT AT ALL
2. NOT BEING ABLE TO STOP OR CONTROL WORRYING: NOT AT ALL
5. BEING SO RESTLESS THAT IT IS HARD TO SIT STILL: SEVERAL DAYS
IF YOU CHECKED OFF ANY PROBLEMS ON THIS QUESTIONNAIRE, HOW DIFFICULT HAVE THESE PROBLEMS MADE IT FOR YOU TO DO YOUR WORK, TAKE CARE OF THINGS AT HOME, OR GET ALONG WITH OTHER PEOPLE: NOT DIFFICULT AT ALL
GAD7 TOTAL SCORE: 4
3. WORRYING TOO MUCH ABOUT DIFFERENT THINGS: SEVERAL DAYS
GAD7 TOTAL SCORE: 4
7. FEELING AFRAID AS IF SOMETHING AWFUL MIGHT HAPPEN: NOT AT ALL
6. BECOMING EASILY ANNOYED OR IRRITABLE: SEVERAL DAYS

## 2022-11-08 ASSESSMENT — COLUMBIA-SUICIDE SEVERITY RATING SCALE - C-SSRS
2. HAVE YOU ACTUALLY HAD ANY THOUGHTS OF KILLING YOURSELF?: NO
TOTAL  NUMBER OF INTERRUPTED ATTEMPTS LIFETIME: NO
ATTEMPT LIFETIME: NO
TOTAL  NUMBER OF ABORTED OR SELF INTERRUPTED ATTEMPTS LIFETIME: NO
1. HAVE YOU WISHED YOU WERE DEAD OR WISHED YOU COULD GO TO SLEEP AND NOT WAKE UP?: NO
6. HAVE YOU EVER DONE ANYTHING, STARTED TO DO ANYTHING, OR PREPARED TO DO ANYTHING TO END YOUR LIFE?: NO

## 2022-11-08 ASSESSMENT — PATIENT HEALTH QUESTIONNAIRE - PHQ9: 5. POOR APPETITE OR OVEREATING: SEVERAL DAYS

## 2022-11-08 NOTE — PROGRESS NOTES
[facility]  Provider Name:  Alberta Webb     Credentials:  PhD, LP    PATIENT'S NAME: Julia Boss  PREFERRED NAME: Julia  PRONOUNS:    She/Her  MRN: [id number]  : 1985  ADDRESS: [address] [place] [id number]  ACCT. NUMBER:  [id number]  DATE OF SERVICE: 22  START TIME: 9:00  END TIME: 9:44  PREFERRED PHONE: [phone]  May we leave a program related message: Yes  SERVICE MODALITY:  Video Visit:      Provider verified identity through the following two step process.  Patient provided:  Patient     Telemedicine Visit: The patient's condition can be safely assessed and treated via synchronous audio and visual telemedicine encounter.      Reason for Telemedicine Visit: Services only offered telehealth    Originating Site (Patient Location): Patient's home    Distant Site (Provider Location): Provider Remote Setting- Home Office    Consent:  The patient/guardian has verbally consented to: the potential risks and benefits of telemedicine (video visit) versus in person care; bill my insurance or make self-payment for services provided; and responsibility for payment of non-covered services.     Patient would like the video invitation sent by:  My Chart    Mode of Communication:  Video Conference via [facility]    Distant Location (Provider):  Off-site    As the provider I attest to compliance with applicable laws and regulations related to telemedicine.    UNIVERSAL ADULT Mental Health DIAGNOSTIC ASSESSMENT    Identifying Information:  Patient is a 37 year old,   individual.    Patient was referred for an assessment by self.  Patient attended the session alone.    Chief Complaint:   The purpose of this evaluation is to: provide treatment recommendations and clarify diagnosis. Patient reported seeking services at this time for diagnostic assessment and recommendations for treatment.  Patient reported that she  has not completed a previous ADHD  "diagnostic assessment.  Patient has not received a previous diagnosis of ADHD. Patient reported that medication has not been prescribed medication to address these problems. 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 She organizes things as a way to procrastinate. Her house looks very clean. She thinks that she can waste a lot of time doing this and it distracts her from doing other things. She is good at completing laundry and putting everything in its place. However, she doesn't complete other tasks (e.g., things that need to be done with writing and on the computer). She talks about these things she wants to do, but she can't follow through with it. She can grasp a concept and talk about it for hours, but she doesn't actually do this task/project. She might \"over share\" in social situations. She has some social anxiety and can feel uncomfortable in social situations. She can't listen to someone talking and text at the same time. It is had for her to listen when others are talking; she is often thinking about something else while others are talking. She feels easily distracted (\"shiny object syndrome\"). She has a step stool everywhere she might need a step stool because otherwise she will walk to get the stool and then get side tracked by 5 other things and forget what she was going to get the stool for (forget the original task).      Social/Family History:  Patient reported they grew up in [place]  .  They were raised by biological parents  .  Parents were always together.  She was the third born of three children. She has an older brother and an older sister. Patient reported that their childhood was \"kee.\" Her parents were \"great\" and they lived in a nice neighborhood and had a good education and were given opportunities and possibilities to participate in things they wanted to try. They had all of their needs met and felt loved and safe.  Patient described their " "current relationships with family of origin as close.     The patient describes their cultural background as . Cultural influences and impact on patient's life structure, values, norms, and healthcare: N/A. Contextual influences on patient's health include: Individual Factors lives alone, unemployed; not Evangelical; very close with family. These factors will be addressed in the Preliminary Treatment plan. Patient identified their preferred language to be English. Patient reported they does not need the assistance of an  or other support involved in therapy.     Patient reported had no significant delays in developmental tasks. She might have been somewhat delayed in speech (they were all under three years old and Client didn't talk until she was about 3 years old). Patient's highest education level was graduate school  . She has her master's degree in development practice (looking at developing countries and why some systems aren't working).  Patient identified the following learning problems: none reported. Client thinks she was able to do \"fine\" in school and \"get by\" but she procrastinated a lot and then would write papers over night. In classes, she would be looking at her phone or computer and was \"zoned out\" and not listening. She stated, \"I was smart enough to know how to do well. I have overcompensated at times. There were better ways I could have studied and learned things, but I just didn't.\" She was always told that she was the \"smart one.\" She was in the \"gifted programs\" at school. She was told she was a high achiever and was expected to be one.  Modifications will not be used to assist communication in therapy.  Patient reports they are  able to understand written materials.    Patient reported the following relationship history: one relationship (had been together for 1.5 years). She stated, \"They just don't seem to go well for me.\"  Patient's current relationship status is single " "for about a year. She stated, \"I was cheated on last year; it hasn't been a clean break.\"  She was cheated on in her past relationship. Patient identified their sexual orientation as heterosexual.  Patient reported having 0 child(violetta). Patient identified siblings and parents as part of their support system.  Patient identified the quality of these relationships as good,  .      Patient's current living/housing situation involves staying in own home/apartment.  She lives alone and they report that housing is stable.    Patient is currently unemployed. She isn't sure what she wants to do for work and wants to figure this out. 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 She is good at the tactical portion of completing the work (hands on activity). Patient reports their finances are obtained through family. Patient does identify finances as a current stressor.      Patient reported that they have not been involved with the legal system.   Patient does not report being under probation/ parole/ jurisdiction.     Patient's Strengths and Limitations:  Patient identified the following strengths or resources that will help them succeed in treatment: commitment to health and well being, friends / good social support, family support, insight, intelligence, motivation, sense of humor and strong social skills. Things that may interfere with the patient's success in treatment include: none identified.     Assessments:  The following assessments were completed by patient for this visit:  PHQ9:   PHQ-9 SCORE 10/29/2018 11/25/2019 8/11/2020 8/27/2021 5/24/2022 8/29/2022 11/7/2022   PHQ-9 Total Score MyChart - - - - 6 (Mild depression) - 1 (Minimal depression)   PHQ-9 Total Score 2 0 2 0 6 3 1     GAD7:   ASAEL-7 SCORE 9/21/2017 10/29/2018 11/25/2019 8/11/2020 8/27/2021 8/29/2022 11/8/2022   Total Score 1 " "5 3 1 0 0 4     [place] Suicide Severity Rating Scale (Lifetime/Recent)  [place] Suicide Severity Rating (Lifetime/Recent) 11/8/2022   1. Wish to be Dead (Lifetime) 0   2. Non-Specific Active Suicidal Thoughts (Lifetime) 0   Actual Attempt (Lifetime) 0   Has subject engaged in non-suicidal self-injurious behavior? (Lifetime) 0   Interrupted Attempts (Lifetime) 0   Aborted or Self-Interrupted Attempt (Lifetime) 0   Preparatory Acts or Behavior (Lifetime) 0   Calculated C-SSRS Risk Score (Lifetime/Recent) No Risk Indicated     Answers for HPI/ROS submitted by the patient on 11/7/2022  PHQ9 TOTAL SCORE: 1        Personal and Family Medical History:  Patient does not report a family history of mental health concerns.  Patient reports family history includes No Known Problems in her brother, father, maternal grandfather, maternal grandmother, paternal grandfather, paternal grandmother, sister, and another family member; Thyroid Disease in her mother..     Patient does report Mental Health Diagnosis and/or Treatment. Patient reported the following previous diagnoses which includes: Depression and Anxiety. She stated, \"I have issues with the way that I think.\" She thinks she can get to a depressed place \"quicker than other people.\" Patient reported symptoms began in middle school. She remembers 8th grade being \"the worst year.\" She had bad acne (cystic acne) that started in middle school and this affected her self esteem and she didn't feel attractive (she would also pick at her pimples). This continued throughout high school and college. She knew she shouldn't touch her face but couldn't control it.   Patient has received mental health services in the past: medication and counseling. Client was on an anti-anxiety medication in 2016 but didn't like how it made her feel. Getting off of it was \"weird and unpleasant.\"  She has been in counseling with various therapists on a few occasions. She was most recently in therapy in " "2017/2018 (consistently, and this was helpful; attended weekly sessions). Client has tried \"Better Help\" recently but didn't like this format (rajat based with a ). She has also talked to an \"astrologist \" in the past. Psychiatric Hospitalizations: None.  Patient denies a history of civil commitment.  Patient is not receiving other mental health services. Client was prescribed Adderall by PCP to take until the evaluation (she only took it twice but was concerned about taking it until testing was completed). Client noted she is concerned about getting addicted to medications (she has heard about this possibility).        Patient has not had a physical exam to rule out medical causes for current symptoms.  Date of last physical exam was greater than a year ago and client was encouraged to schedule an exam with PCP. The patient has a [place] Primary Care Provider, who is named [facility]..  Patient reports the following current medical concerns: HPV for the past 5 years (\"can't kick it\").  Patient denies any issues with pain.. There are not significant appetite / nutritional concerns / weight changes. She would like to lose weight; she is working with a . She doesn't have a consistent diet and doesn't prepare meals for herself. Patient does not report a history of head injury / trauma / cognitive impairment.     Patient reports current meds as:   Outpatient Medications Marked as Taking for the 11/8/22 encounter (Virtual Visit) with Alberta Webb, PhD   Medication Sig     drospirenone-ethinyl estradiol (DESHAWN) 3-0.02 MG tablet Take 1 tablet by mouth daily . Active tablets only, skip placebo pills. Take continuously.       Medication Adherence:  Patient reports taking.  taking prescribed medications as prescribed.    Patient Allergies:  No Known Allergies    Medical History:    Past Medical History:   Diagnosis Date     Acne, unspecified acne type 08/31/2016 " "    Anxiety 08/31/2016     Cervical high risk HPV (human papillomavirus) test positive     2016, 2017, 2018, 2019, 2020, 2021     Encounter for IUD insertion 10/12/2017    Luis inserted by Dr Ricci     Herpes simplex vulvovaginitis 02/2016    PCR confirmed vulvovaginal HSV-1     History of vaccination against human papillomavirus     completed     Moderate episode of recurrent major depressive disorder (H) 08/31/2016     Overweight 08/25/2010         Current Mental Status Exam:   Appearance:  Appropriate    Eye Contact:  Good   Psychomotor:  Normal       Gait / station:  no problem  Attitude / Demeanor: Cooperative   Speech      Rate / Production: Hyperverbal       Volume:  Normal  volume      Language:  no problems and good  Mood:   Normal  Affect:   Appropriate    Thought Content: Clear   Thought Process: Logical  Circumstantial      Associations: No loosening of associations  Insight:   Good   Judgment:  Intact   Orientation:  All  Attention/concentration: Good      Substance Use:  Patient did report a family history of substance use concerns; see medical history section for details. Her mother was in treatment for alcohol abuse. Client noticed that when her drinking was at its worst, she had bad anxiety as well. Patient has not received chemical dependency treatment in the past.  Patient has not ever been to detox.  Client thinks she drinks too much. She can go without drinking, but she enjoys wine and drinking with friends. She feels she doesn't control it well. She will over imbibe and doesn't know when to stop. She stated, \"I equate a night being over with whether there are drinks left.\" Client is concerned about following in her mother's foot steps and she wants to cut back on her drinking/reduce drinking. She thinks that she drinks to cope with stress/anxiety at the end of the day. She uses drinking to \"sada\" that the day is over and that it is time to relax (if she doesn't have a drink, then she may " "still \"go into a productive kick\" but if she has a drink, then it is an excuse to turn things off). She might over drink in social situations to feel more comfortable.    Patient is not currently receiving any chemical dependency treatment.           Substance History of use Age of first use Date of last use     Pattern and duration of use (include amounts and frequency)   Alcohol currently use   18 11/07/22 REPORTS SUBSTANCE USE: reports using substance 4-5 times per week and has 2-5 drinks or glasses of wine at a time.   Patient reports heaviest use was college/grad school.. She will drink more on the weekends or at an event (go out to a meal with friends).   Cannabis   currently use 21 -she also uses this for meditation because it helps her to \"stop over thinking\" and it helps to \"clear my brain\" 11/07/22 -Edibles; for the last year (since becoming more accessible) -she uses at night time for sleep (CBN and melatonin) 5-10mg REPORTS SUBSTANCE USE: reports using substance 3-5 times per week and has 1 edible at a time.   Patient reports heaviest use is current use. (in the last year)     Amphetamines   never used     REPORTS SUBSTANCE USE: N/A   Cocaine/crack    never used       REPORTS SUBSTANCE USE: N/A   Hallucinogens never used         REPORTS SUBSTANCE USE: N/A   Inhalants never used         REPORTS SUBSTANCE USE: N/A   Heroin never used         REPORTS SUBSTANCE USE: N/A   Other Opiates never used     REPORTS SUBSTANCE USE: N/A   Benzodiazepine   never used     REPORTS SUBSTANCE USE: N/A   Barbiturates never used     REPORTS SUBSTANCE USE: N/A   Over the counter meds never used     REPORTS SUBSTANCE USE: N/A   Caffeine currently use 17   REPORTS SUBSTANCE USE: reports using substance 1 times per day and has 2-3 cups of coffee at a time.   Patient reports heaviest use is current use.   Nicotine  never used     REPORTS SUBSTANCE USE: N/A   Other substances not listed above:  Identify:  never used     REPORTS " SUBSTANCE USE: N/A     Patient reported the following problems as a result of their substance use: relationship problems.    Substance Use: relationship problems    Based on the positive CAGE score and clinical interview there  are indications of drug or alcohol abuse. Recommendation for substance abuse disorder evaluation with a substance use professional was given. Therapist did recommend client to reduce use or abstain from alcohol or substance use. Therapist did recommend structured treatment and or community support (AA, 12 step group, etc.).  .      Significant Losses / Trauma / Abuse / Neglect Issues:   Patient did not  serve in the .  There are indications or report of significant loss, trauma, abuse or neglect issues related to: divorce / relational changes cheated on in a relationship.  Concerns for possible neglect are not present.     Safety Assessment:   Patient denies current homicidal ideation and behaviors.  Patient denies current self-injurious ideation and behaviors.    Patient denied risk behaviors associated with substance use.  Patient denies any high risk behaviors associated with mental health symptoms.  Patient reports the following current concerns for their personal safety: None.  Patient reports there are not firearms in the house.         History of Safety Concerns:  Patient denied a history of homicidal ideation.     Patient denied a history of personal safety concerns.    Patient denied a history of assaultive behaviors.    Patient denied a history of sexual assault behaviors.     Patient denied a history of risk behaviors associated with substance use.  Patient denies any history of high risk behaviors associated with mental health symptoms.  Patient reports the following protective factors: forward or future oriented thinking; dedication to family or friends; safe and stable environment; sense of belonging; purpose; daily obligations; structured day; commitment to well being;  sense of meaning; strong sense of self worth or esteem    Risk Plan:  See Recommendations for Safety and Risk Management Plan    Review of Symptoms per patient report:  Depression: Difficulties concentrating  Vashti:  No Symptoms  Psychosis: No Symptoms  Anxiety: Excessive worry, Psychomotor agitation, Ruminations, Poor concentration and Irritability  Panic:  No symptoms  Post Traumatic Stress Disorder:  No Symptoms   Eating Disorder: No Symptoms  ADD / ADHD:  Inattentive, Difficulties listening, Poor task completion and Distractibility  Conduct Disorder: No symptoms  Autism Spectrum Disorder: No symptoms  Obsessive Compulsive Disorder: Symetry and Organization (this has caused issues with her family relationships; she gets frustrated when the family cabin is in disarray after she has put things away); She did organization professionally and worked at the [employer]    Patient reports the following compulsive behaviors and treatment history: Picking - has not had treatment..      Diagnostic Criteria:   B. The person finds it difficult to control the worry.   - Restlessness or feeling keyed up or on edge.    - Being easily fatigued.    - Difficulty concentrating or mind going blank.    - Irritability.  - Often has difficulty sustaining attention in tasks or play activities  - Often does not seem to listen when spoken to directly  - Often does not follow through on instructions and fails to finish schoolwork, chores, or duties in the workplace  - Often avoids, dislikes, or is reluctant to engage in tasks that require sustained mental effort  - Is often easily distractedby extraneous stimuli      Functional Status:  Patient reports the following functional impairments:  management of the household and or completion of tasks and work / vocational responsibilities.         Clinical Summary:  1. Reason for assessment: ADHD Evaluation  .  2. Psychosocial, Cultural and Contextual Factors: unemployed   .  3. Principal DSM5 Diagnoses  (Sustained by DSM5 Criteria Listed Above):   300.09 (F41.8) Other Specified Anxiety Disorder .  RULE OUT: OCD  RULE OUT: ADHD  RULE OUT: Alcohol Use Disorder  6. Prognosis: Expect Improvement and Maintain Current Status / Prevent Deterioration.  7. Likely consequences of symptoms if not treated: issues at home/with work/career.  8. Client strengths include:  has a previous history of therapy, insightful, motivated, open to learning, open to suggestions / feedback and support of family, friends and providers .     Recommendations:     1. Plan for Safety and Risk Management:   Safety and Risk: Recommended that patient call 911 or go to the local ED should there be a change in any of these risk factors..          Report to child / adult protection services was NA.      4. Resources/Service Plan:    services are not indicated.   Modifications to assist communication are not indicated.   Additional disability accommodations are not indicated.      5. Collaboration:   Collaboration / coordination of treatment will be initiated with the following  support professionals: primary care physician.      6.  Referrals:   The following referrals will be initiated: TBD if OP MH or CD Eval is appropriate. Next Scheduled Appointment: TBD.     A Release of Information has been obtained for the following: NA.     Emergency Contact was not obtained.     7. CARMEN:    CARMEN:  Discussed the general effects of drugs and alcohol on health and well-being. Provider gave patient printed information about the effects of chemical use on their health and well being. Recommendations:  Reduce use of alcohol and cannabis .     8. Records:   These were reviewed at time of assessment.   Information in this assessment was obtained from the medical record and  provided by patient who is a good historian.    Patient will have open access to their mental health medical record.    Provider Name/ Credentials:   Alberta Webb, PhD, LP  November 8, 2022

## 2022-11-16 ENCOUNTER — VIRTUAL VISIT (OUTPATIENT)
Dept: PSYCHOLOGY | Facility: CLINIC | Age: 37
End: 2022-11-16
Payer: COMMERCIAL

## 2022-11-16 ENCOUNTER — PATIENT OUTREACH (OUTPATIENT)
Dept: OBGYN | Facility: CLINIC | Age: 37
End: 2022-11-16

## 2022-11-16 ENCOUNTER — DOCUMENTATION ONLY (OUTPATIENT)
Dept: PSYCHOLOGY | Facility: CLINIC | Age: 37
End: 2022-11-16

## 2022-11-16 DIAGNOSIS — F41.9 ANXIETY: Primary | ICD-10-CM

## 2022-11-16 PROCEDURE — 90834 PSYTX W PT 45 MINUTES: CPT | Mod: 95 | Performed by: PSYCHOLOGIST
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
## 2022-11-16 NOTE — TELEPHONE ENCOUNTER
11/3/22 [place] [facility]: suggestive of PAULY 1, ECC: squamous metaplasia and mild atypia, insufficient for low-grade dysplasia. Plan 1 year cotest

## 2022-11-16 NOTE — CONFIDENTIAL NOTE
"Name: Julia Boss  MRN: [id number]  : 1985      Albaro Adult ADHD Rating Scale-IV: Self and Other Reports (BAARS-IV)  The BAARS-IV assesses for symptoms of ADHD that are experienced in one's daily life. This assessment measure includes self and collateral rating scales designed to provide information regarding current and childhood symptoms of ADHD including inattention, hyperactivity, and impulsivity. Self-report scores are reported as percentiles. Scores at the 76th-83rd percentile are considered marginal, scores at the 84th-92nd percentile are considered borderline, scores at the 93rd-95th percentile are considered mild, scores at the 96th-98th percentile are considered moderate, and those at the 99th percentile are considered severe. Collateral or \"other\" rating scales are reported as number of symptoms observed in comparison to those reported by the client. Norms and percentile scores are not available for collateral reports.      Current Symptoms Scale--Self Report:   Client completed the self-report inventory of current symptoms. The results indicate that the client's Total ADHD Score was 44 which places them in the 96th percentile for overall ADHD symptoms. In addition, the client endorsed the following occur \"often\" or \"very often\": 3/9 (92nd percentile) Inattention symptoms, 5/9 (97th percentile) Hyperactivity/Impulsivity symptoms, and 3/9 (88th percentile) Sluggish Cognitive Tempo symptoms. Overall, the results suggest the client is reporting borderline symptoms of inattention and moderate symptoms of hyperactivity/impulsivity at this time.      Current Symptoms Scale--Other Report:  Client's mother completed the collateral report inventory of current symptoms. Based on the collateral contact's observation of symptoms, the client demonstrates the following \"often\" or \"very often\": 0/9 Inattention symptoms, 1/5 Hyperactivity symptoms, 2/4 Impulsivity symptoms, and 0/9 Sluggish Cognitive " "Tempo symptoms. The client's Total ADHD Score was 31. The collateral- and self-report scores are discrepant. Her mother did not note symptoms of ADHD at this time.    Childhood Symptoms Scale--Self-Report:  Client completed the self-report inventory of childhood symptoms. The results indicate that the client's Total ADHD Score was 27 which places them in the 89th percentile for overall ADHD symptoms in childhood. In addition, the client endorsed having experienced the following \"often\" or \"very often\": 0/9 (1-75th percentile) Inattention symptoms and 4/9 (89th percentile) Hyperactivity-Impulsivity symptoms. Overall, the results suggest the client experienced borderline symptoms of hyperactivity/impulsivity in childhood.     Childhood Symptoms Scale--Other Report:  Client's mother completed the collateral report inventory of current symptoms. Based on the collateral contact's observation of symptoms, the client demonstrates the following \"often\" or \"very often\": 0/9 Inattention symptoms, 4/9 Hyperactivity/Impulsivity symptoms. The client's Total ADHD Score was 32. The collateral- and self-report scores are similar and suggest Client experienced symptoms of hyperactivity/impulsivity in childhood.    Albaro Functional Impairment Scale: Self and Other Reports (BFIS)  The BFIS is used to assess an individuals' psychosocial impairment in major life/daily activities that may be due to a mental health disorder. This assessment measure includes self and collateral rating scales. Self-report scores are reported as percentiles. Scores at the 76th-83rd percentile are considered marginal, scores at the 84th-92nd percentile are considered borderline, scores at the 93rd-95th percentile are considered mild, scores at the 96th-98th percentile are considered moderate, and those at the 99th percentile are considered severe. Collateral or \"other\" rating scales are reported as number of symptoms observed in comparison to those reported " "by the client. Norms and percentile scores are not available for collateral reports.      Results indicate the client did not identify impairment (scores at or greater than 93rd percentile) in any areas. Mean Impairment Score was 2.69 (51-75th percentile) indicating the client is not reporting impairment in functioning across domains. Client's mother completed the collateral rating scale, which indicated similar results (e.g., Mean Impairment Score of 4.38). They noted impairment in the areas of: home-family, work, social-strangers, social-friends, and sexual relations.      [place] Deficits in Executive Functioning Scale (BDEFS)  The BDEFS is a measure used for evaluating dimensions of adult executive functioning in daily life. This assessment measure includes self and collateral rating scales. Self-report scores are reported as percentiles. Scores at the 76th-83rd percentile are considered marginal, scores at the 84th-92nd percentile are considered borderline, scores at the 93rd-95th percentile are considered mild, scores at the 96th-98th percentile are considered moderate, and those at the 99th percentile are considered severe. Collateral or \"other\" rating scales are reported as number of symptoms observed in comparison to those reported by the client. Norms and percentile scores are not available for collateral reports.      Results indicate the client's Total Executive Functioning Score was 207 (94th percentile). The ADHD-Executive Functioning Index score was 24 (92nd percentile). These scores suggest the client is reporting borderline to mild deficits in executive functioning. Specifically, they noted the following deficits: self-management to time (moderate); self-restraint (moderate); self-motivation (moderate) and self-regulation of emotions (mild). Client s mother completed the collateral report which demonstrated somewhat discrepant results. Her scores were generally lower. She noted deficits in the areas " of: self-restraint and self-regulation of emotions.    Generalized Anxiety Disorder Questionnaire (ASAEL-7)  This questionnaire is designed to screen for anxiety in adults. Based on the client's score of 7, they are reporting mild symptoms of anxiety at this time. Client endorsed the following symptoms of anxiety: feeling nervous/anxious/on edge; worrying too much about different things; not being able to stop or control worrying; trouble relaxing; restlessness; and becoming easily annoyed or irritable.     Patient Health Questionnaire- 9 (PHQ-9)   This questionnaire is designed to screen for depression in adults. Based on the client's score of 10, they are reporting moderate symptoms of depression at this time. Client endorsed the following symptoms of depression: little interest or pleasure in doing things; feeling down/depressed/hopeless; trouble falling asleep/staying asleep/sleeping too much; feeling tired or having little energy; poor appetite or overeating; feeling bad about self; and poor concentration.

## 2022-11-17 ENCOUNTER — DOCUMENTATION ONLY (OUTPATIENT)
Dept: PSYCHOLOGY | Facility: CLINIC | Age: 37
End: 2022-11-17

## 2022-11-17 NOTE — CONFIDENTIAL NOTE
Name: Julia Boss  MRN: [id number]  : 1985    Client completed the [place] Multiphasic Personality Inventory-2 (MMPI-2), a self-report personality inventory, as part of her evaluation. Validity scales indicate that the client responded in an open and consistent manner, resulting in a valid profile. While overreporting is possible, it is also likely that the Client has limited resources for coping with the stresses and demands of daily life. The following results should be interpreted with caution and in light of other information. Her profile reflects a high level of general emotional distress. Individuals with similar profiles experience depression with tension, anxiety, worry, intrusive thoughts, insecurity, and apprehensiveness. Individuals with similar profiles may ruminate about personal shortcomings, over-anticipate negative outcomes, and overreact to minor problems and mistakes. They experience severe loss of control of their thinking and feel highly vulnerable to being injured or undermined by others  actions. They may experience self-doubt, reduced occupational performance, problems with concentration, memory, judgment, and decision making. They may also experience vegetative symptoms of depression such as anhedonia, sleep disturbance, and loss of interest, energy and motivation. They may experience a sense of mental failure or decline and the depletion of energy needed to accomplish mental work. Thinking and problem-solving are experienced as effortful and as subject to going off course even when significant effort is made. Thinking may be viewed as impaired or unreliable; they may have a sense that  I can t seem to get my mind right.  They have a tendency to overindulge in daydreaming. Individuals with similar profiles report impediments to performance in the workplace including tension, worry, distractibility, indecision and feeling overwhelmed. They likely demonstrate overly busy but  massively inefficient cognitive activity. They have a low threshold for experiencing hostility and vengeful feelings. They can be self-centered, irritable, suspicious and lacking in empathy. They likely feel isolation from other people and lack a sense of belongingness.

## 2022-12-08 ENCOUNTER — PSYCHE (OUTPATIENT)
Dept: PSYCHOLOGY | Facility: CLINIC | Age: 37
End: 2022-12-08
Payer: COMMERCIAL

## 2022-12-08 DIAGNOSIS — F41.9 ANXIETY: Primary | ICD-10-CM

## 2022-12-08 NOTE — PROGRESS NOTES
[facility]    Patient: Julia Boss  YOB: 1985  MRN: [id number]  Date(s) of assessment: 12/8/2022      Wechsler Adult Intelligence Scale--Fourth Edition (WAIS-IV)  Client s intellectual functioning was assessed using the WAIS-IV. This measure provides a Full Scale Score, as well as several index scores measuring specific areas of cognitive ability. Index scores are reported using standard scores which have a mean of 100 and a standard deviation of 15. Subtest scores are reported using scaled scores that have a mean of 10 and a standard deviation of 2. Client s scores are reported at the 95 percent confidence interval, which indicates that there is a 95 percent chance that his true score falls within the stated range.  Results indicate that the FSIQ, a measure of overall intelligence, is not the best descriptor of the client s functioning. Results indicate that the FSIQ, a measure of overall intelligence, is not the best descriptor of the client s functioning. Client's General Abilities Index (GAI) is composed of strong measures of general ability (VCI and TINY subtest scores) and is especially useful for estimating general ability for individuals whose scores on memory and speed sub-tests deviate significantly from their scores on verbal and non-verbal tasks. Client's GAI score was in the Very Superior range (95% chance 131-141; 99th percentile).     Client s score on the Verbal Comprehension Index (VCI), a measure of verbal concept formation, verbal reasoning, and acquired knowledge, was in the Superior range (95% chance 120-132; 96percentile). Client s score on the Perceptual Reasoning Index (TINY), a measure of perceptual and fluid reasoning, spatial processing, and visual-motor integration, was in the Very Superior range (95% chance 128-140; 99 percentile). Client scored in the Very Superior range (95% chance 127-141; 99th percentile) on the Working Memory Index (WMI), a  measure of ability to retain information in memory, perform some operation or manipulation, and produce a result. Client s score on the Processing Speed Index (PSI), a measure of short-term visual memory, attention, and visual-motor coordination was in the High Average range (95% chance 104-121; 82nd percentile).     Summary of WAIS-IV Index Scores   Index  Score Percentile Rank Confidence  Interval* Qualitative Description   Verbal Comprehension Index (VCI) 127 96 120-132 Superior   Perceptual Reasoning Index (TINY) 136 99 128-140 Very Superior   Working Memory Index (WMI) 136 99 127-141 Very Superior   Processing Speed Index (PSI) 114 82 104-121 High Average   General Abilities Index (GAI) 137 99 132-140 Very Superior     Summary of WAIS-IV Scaled Subtest Scores  Verbal Comprehension Perceptual Reasoning   Similarities 17 Block Design 17   Vocabulary 14 Matrix Reasoning 17   Information 13 Visual Puzzles 15   Working Memory Processing Speed   Digit Span 18 Coding 15   Arithmetic 15 Symbol Search 10   *Confidence intervals at 95th percentile      Alberta Webb, PhD, LP

## 2022-12-14 ENCOUNTER — VIRTUAL VISIT (OUTPATIENT)
Dept: PSYCHOLOGY | Facility: CLINIC | Age: 37
End: 2022-12-14
Payer: COMMERCIAL

## 2022-12-14 ENCOUNTER — DOCUMENTATION ONLY (OUTPATIENT)
Dept: PSYCHOLOGY | Facility: CLINIC | Age: 37
End: 2022-12-14

## 2022-12-14 DIAGNOSIS — F33.0 MAJOR DEPRESSIVE DISORDER, RECURRENT EPISODE, MILD (H): ICD-10-CM

## 2022-12-14 DIAGNOSIS — F90.2 ATTENTION DEFICIT HYPERACTIVITY DISORDER, COMBINED TYPE: ICD-10-CM

## 2022-12-14 DIAGNOSIS — F41.9 ANXIETY: Primary | ICD-10-CM

## 2022-12-14 PROCEDURE — 96137 PSYCL/NRPSYC TST PHY/QHP EA: CPT | Mod: 95 | Performed by: PSYCHOLOGIST

## 2022-12-14 PROCEDURE — 96136 PSYCL/NRPSYC TST PHY/QHP 1ST: CPT | Mod: 95 | Performed by: PSYCHOLOGIST

## 2022-12-14 PROCEDURE — 96131 PSYCL TST EVAL PHYS/QHP EA: CPT | Mod: 95 | Performed by: PSYCHOLOGIST

## 2022-12-14 PROCEDURE — 96130 PSYCL TST EVAL PHYS/QHP 1ST: CPT | Mod: 95 | Performed by: PSYCHOLOGIST

## 2022-12-14 NOTE — PROGRESS NOTES
[facility]  ADHD Evaluation    Patient: Julia Boss  YOB: 1985  MRN: [id number]    Date(s) of assessment: Diagnostic Assessment (11/8/22; 11/16/22), Albaro self-report and collateral measures scored and interpreted (11/16/22), MMPI -2 (administered on 11/17/22, interpreted on 11/17/22), and WAIS-IV (12/8/22)     Information about appointment:  Client attended two sessions to aid in determining client's mental health diagnosis or diagnoses and treatment recommendations that best address client concerns. Available medical records were reviewed. There were no previous psychological evaluations for review. A diagnostic assessment was conducted at the initial appointment. Client completed several rating scales to assist in assessing attention-related and other mental health symptoms that may be causing impairments in functioning. Rating scales were also completed by a collateral contact. Client also completed personality testing to aid in diagnostic clarification.      Assessment tools:   Albaro Adult ADHD Rating Scale-IV: Self and Other Reports (BAARS-IV), Albaro Functional Impairment Scale: Self and Other Reports (BFIS), Albaro Deficits in Executive Functioning Scale: Self and Other Reports (BDEFS), Wechsler Adult Intelligence Scale--Fourth Edition (WAIS-IV), Patient Health Questionnaire-9 (PHQ-9), Generalized Anxiety Disorder-7 (ASAEL-7) and [place] Multiphasic Personality Inventory (MMPI) *Testing administered remotely    Assessment Results:  Behavioral Observations:  Client arrived to each session on-time. She was pleasant and cooperative at all times. She appeared motivated to do her best on each task. Client did demonstrate significant difficulties with inattention during the sessions. She frequently produced spontaneous speech and was easily distracted by stimuli in the room (e.g., paintings on the wall). Client was also hyperverbal and started guessing what the  "instructions were to each task before examiner could finish administering them. During cognitive testing, Client sometimes began providing a response before the examiner finished providing instructions. She asked if she could close her eyes on multiple occasions and often spoke to herself as she tried to complete each item. She was frequently self-critical and would second-guess herself. Client explained that, during the Symbol Search subtest,  While I was doing this, I was literally thinking about what I m going to get from the store for dinner later and what I m going to be watching on TV. My mind wasn t here at all.  She felt it was noteworthy to share with writer how distracted she felt during that task. Despite presenting as somewhat anxious, the following results are likely to be an accurate reflection of client's current functioning.     Albaro Adult ADHD Rating Scale-IV: Self and Other Reports (BAARS-IV)  The BAARS-IV assesses for symptoms of ADHD that are experienced in one's daily life. This assessment measure includes self and collateral rating scales designed to provide information regarding current and childhood symptoms of ADHD including inattention, hyperactivity, and impulsivity. Self-report scores are reported as percentiles. Scores at the 76th-83rd percentile are considered marginal, scores at the 84th-92nd percentile are considered borderline, scores at the 93rd-95th percentile are considered mild, scores at the 96th-98th percentile are considered moderate, and those at the 99th percentile are considered severe. Collateral or \"other\" rating scales are reported as number of symptoms observed in comparison to those reported by the client. Norms and percentile scores are not available for collateral reports.      Current Symptoms Scale--Self Report:   Client completed the self-report inventory of current symptoms. The results indicate that the client's Total ADHD Score was 44 which places them in " "the 96th percentile for overall ADHD symptoms. In addition, the client endorsed the following occur \"often\" or \"very often\": 3/9 (92nd percentile) Inattention symptoms, 5/9 (97th percentile) Hyperactivity/Impulsivity symptoms, and 3/9 (88th percentile) Sluggish Cognitive Tempo symptoms. Overall, the results suggest the client is reporting borderline symptoms of inattention and moderate symptoms of hyperactivity/impulsivity at this time.      Current Symptoms Scale--Other Report:  Client's mother completed the collateral report inventory of current symptoms. Based on the collateral contact's observation of symptoms, the client demonstrates the following \"often\" or \"very often\": 0/9 Inattention symptoms, 1/5 Hyperactivity symptoms, 2/4 Impulsivity symptoms, and 0/9 Sluggish Cognitive Tempo symptoms. The client's Total ADHD Score was 31. The collateral- and self-report scores are discrepant. Her mother did not note symptoms of ADHD at this time.     Childhood Symptoms Scale--Self-Report:  Client completed the self-report inventory of childhood symptoms. The results indicate that the client's Total ADHD Score was 27 which places them in the 89th percentile for overall ADHD symptoms in childhood. In addition, the client endorsed having experienced the following \"often\" or \"very often\": 0/9 (1-75th percentile) Inattention symptoms and 4/9 (89th percentile) Hyperactivity-Impulsivity symptoms. Overall, the results suggest the client experienced borderline symptoms of hyperactivity/impulsivity in childhood.     Childhood Symptoms Scale--Other Report:  Client's mother completed the collateral report inventory of current symptoms. Based on the collateral contact's observation of symptoms, the client demonstrates the following \"often\" or \"very often\": 0/9 Inattention symptoms, 4/9 Hyperactivity/Impulsivity symptoms. The client's Total ADHD Score was 32. The collateral- and self-report scores are similar and suggest Client " "experienced symptoms of hyperactivity/impulsivity in childhood.     Albaro Functional Impairment Scale: Self and Other Reports (BFIS)  The BFIS is used to assess an individuals' psychosocial impairment in major life/daily activities that may be due to a mental health disorder. This assessment measure includes self and collateral rating scales. Self-report scores are reported as percentiles. Scores at the 76th-83rd percentile are considered marginal, scores at the 84th-92nd percentile are considered borderline, scores at the 93rd-95th percentile are considered mild, scores at the 96th-98th percentile are considered moderate, and those at the 99th percentile are considered severe. Collateral or \"other\" rating scales are reported as number of symptoms observed in comparison to those reported by the client. Norms and percentile scores are not available for collateral reports.      Results indicate the client did not identify impairment (scores at or greater than 93rd percentile) in any areas. Mean Impairment Score was 2.69 (51-75th percentile) indicating the client is not reporting impairment in functioning across domains. Client's mother completed the collateral rating scale, which indicated similar results (e.g., Mean Impairment Score of 4.38). They noted impairment in the areas of: home-family, work, social-strangers, social-friends, and sexual relations.      Albaro Deficits in Executive Functioning Scale (BDEFS)  The BDEFS is a measure used for evaluating dimensions of adult executive functioning in daily life. This assessment measure includes self and collateral rating scales. Self-report scores are reported as percentiles. Scores at the 76th-83rd percentile are considered marginal, scores at the 84th-92nd percentile are considered borderline, scores at the 93rd-95th percentile are considered mild, scores at the 96th-98th percentile are considered moderate, and those at the 99th percentile are considered severe. " "Collateral or \"other\" rating scales are reported as number of symptoms observed in comparison to those reported by the client. Norms and percentile scores are not available for collateral reports.      Results indicate the client's Total Executive Functioning Score was 207 (94th percentile). The ADHD-Executive Functioning Index score was 24 (92nd percentile). These scores suggest the client is reporting borderline to mild deficits in executive functioning. Specifically, they noted the following deficits: self-management to time (moderate); self-restraint (moderate); self-motivation (moderate) and self-regulation of emotions (mild). Client s mother completed the collateral report which demonstrated somewhat discrepant results. Her scores were generally lower. She noted deficits in the areas of: self-restraint and self-regulation of emotions.    Wechsler Adult Intelligence Scale--Fourth Edition (WAIS-IV)  Client s intellectual functioning was assessed using the WAIS-IV. This measure provides a Full Scale Score, as well as several index scores measuring specific areas of cognitive ability. Index scores are reported using standard scores which have a mean of 100 and a standard deviation of 15. Subtest scores are reported using scaled scores that have a mean of 10 and a standard deviation of 2. Client s scores are reported at the 95 percent confidence interval, which indicates that there is a 95 percent chance that his true score falls within the stated range. Results indicate that the FSIQ, a measure of overall intelligence, is not the best descriptor of the client s functioning. Client's General Abilities Index (GAI) is composed of strong measures of general ability (VCI and TINY subtest scores) and is especially useful for estimating general ability for individuals whose scores on memory and speed sub-tests deviate significantly from their scores on verbal and non-verbal tasks. Client's GAI score was in the Very " Superior range (95% chance 131-141; 99th percentile).      Client s score on the Verbal Comprehension Index (VCI), a measure of verbal concept formation, verbal reasoning, and acquired knowledge, was in the Superior range (95% chance 120-132; 96percentile). Client s score on the Perceptual Reasoning Index (TINY), a measure of perceptual and fluid reasoning, spatial processing, and visual-motor integration, was in the Very Superior range (95% chance 128-140; 99 percentile). Client scored in the Very Superior range (95% chance 127-141; 99th percentile) on the Working Memory Index (WMI), a measure of ability to retain information in memory, perform some operation or manipulation, and produce a result. Client s score on the Processing Speed Index (PSI), a measure of short-term visual memory, attention, and visual-motor coordination was in the High Average range (95% chance 104-121; 82nd percentile).      Summary of WAIS-IV Index Scores    Index  Score Percentile Rank Confidence  Interval* Qualitative Description   Verbal Comprehension Index (VCI) 127 96 120-132 Superior   Perceptual Reasoning Index (TINY) 136 99 128-140 Very Superior   Working Memory Index (WMI) 136 99 127-141 Very Superior   Processing Speed Index (PSI) 114 82 104-121 High Average   General Abilities Index (GAI) 137 99 132-140 Very Superior      Summary of WAIS-IV Scaled Subtest Scores        Verbal Comprehension Perceptual Reasoning   Similarities 17 Block Design 17   Vocabulary 14 Matrix Reasoning 17   Information 13 Visual Puzzles 15   Working Memory Processing Speed   Digit Span 18 Coding 15   Arithmetic 15 Symbol Search 10   *Confidence intervals at 95th percentile    Summary of [place] Multiphasic Personality Inventory--Second Edition   Client completed the [place] Multiphasic Personality Inventory-2 (MMPI-2), a self-report personality inventory, as part of her evaluation. Validity scales indicate that the client responded in an open and  consistent manner, resulting in a valid profile. While overreporting is possible, it is also likely that the Client has limited resources for coping with the stresses and demands of daily life. The following results should be interpreted with caution and in light of other information. Her profile reflects a high level of general emotional distress. Individuals with similar profiles experience depression with tension, anxiety, worry, intrusive thoughts, insecurity, and apprehensiveness. Individuals with similar profiles may ruminate about personal shortcomings, over-anticipate negative outcomes, and overreact to minor problems and mistakes. They experience severe loss of control of their thinking and feel highly vulnerable to being injured or undermined by others  actions. They may experience self-doubt, reduced occupational performance, problems with concentration, memory, judgment, and decision making. They may also experience vegetative symptoms of depression such as anhedonia, sleep disturbance, and loss of interest, energy and motivation. They may experience a sense of mental failure or decline and the depletion of energy needed to accomplish mental work. Thinking and problem-solving are experienced as effortful and as subject to going off course even when significant effort is made. Thinking may be viewed as impaired or unreliable; they may have a sense that  I can t seem to get my mind right.  They have a tendency to overindulge in daydreaming. Individuals with similar profiles report impediments to performance in the workplace including tension, worry, distractibility, indecision and feeling overwhelmed. They likely demonstrate overly busy but massively inefficient cognitive activity. They have a low threshold for experiencing hostility and vengeful feelings. They can be self-centered, irritable, suspicious and lacking in empathy. They likely feel isolation from other people and lack a sense of  belongingness.    Generalized Anxiety Disorder Questionnaire (ASAEL-7)  This questionnaire is designed to screen for anxiety in adults. Based on the client's score of 7, they are reporting mild symptoms of anxiety at this time. Client endorsed the following symptoms of anxiety: feeling nervous/anxious/on edge; worrying too much about different things; not being able to stop or control worrying; trouble relaxing; restlessness; and becoming easily annoyed or irritable.     Patient Health Questionnaire- 9 (PHQ-9)   This questionnaire is designed to screen for depression in adults. Based on the client's score of 10, they are reporting moderate symptoms of depression at this time. Client endorsed the following symptoms of depression: little interest or pleasure in doing things; feeling down/depressed/hopeless; trouble falling asleep/staying asleep/sleeping too much; feeling tired or having little energy; poor appetite or overeating; feeling bad about self; and poor concentration.    Summary (based on clinical interview, review of records, test results):  Client is a 37 year old, , single female. Client was referred for a diagnostic assessment by PCP.  The purpose of this evaluation is to: provide treatment recommendations and clarify diagnosis.  Client's presenting concerns include: difficulties managing her time (she is often running a few minutes late for things). She is never early.  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 She organizes things as a way to procrastinate. Her house looks very clean. She thinks that she can waste a lot of time doing this and it distracts her from doing other things. She is good at completing laundry and putting everything in its place. However, she doesn't complete other tasks (e.g., things that need to be done with writing and on the computer). She talks about these things she wants to do, but she can't follow through with it. She can grasp a  "concept and talk about it for hours, but she doesn't actually do this task/project. She might \"over share\" in social situations. She has some social anxiety and can feel uncomfortable in social situations. She can't listen to someone talking and text at the same time. It is had for her to listen when others are talking; she is often thinking about something else while others are talking. She feels easily distracted (\"shiny object syndrome\"). She has a step stool everywhere she might need a step stool because otherwise she will walk to get the stool and then get side tracked by 5 other things and forget what she was going to get the stool for (forget the original task). Client noted she is able to \"present myself as though I'm smart and on top of things so people think I have it together more than I do.\" She has written out plans/calendars about when she is going to write and finish her book, but because she isn't accountable to anybody else, she doesn't follow through. She thinks her mother likely felt bad endorsing high scores on the collateral report measures. Her mother also has a short attention span and doesn't quite understand the extent to which Client is struggling. She can make a space appear to be organized, so she feels that people assume she is more organized than she actually is. Her computer has dozens of drafts saved and they are not organized; her files are everywhere. She has a hard time remembering things that have happened in her life. (She stated, \"I tell myself it's because I've done so many things and I've done too much\"). Client stated that symptoms have resulted in the following functional impairments: management of the household and or completion of tasks and work / vocational responsibilities. Client reported that she has not completed a previous ADHD diagnostic assessment.  Client has not received a previous diagnosis of ADHD. Client reported that medication has not been prescribed " "medication to address these problems. Client reported that these problems began in childhood. Client has not attempted to resolve these concerns in the past. Client reported the following previous diagnoses which includes: Depression and Anxiety. She stated, \"I have issues with the way that I think.\" She thinks she can get to a depressed place \"quicker than other people.\" Client reported symptoms began in middle school. She remembers 8th grade being \"the worst year.\" She had bad acne (cystic acne) that started in middle school and this affected her self-esteem and she didn't feel attractive (she would also pick at her pimples). This continued throughout high school and college. She knew she shouldn't touch her face but couldn't control it. Client has received mental health services in the past: medication and counseling. Client was on an anti-anxiety medication in 2016 but didn't like how it made her feel. Getting off of it was \"weird and unpleasant.\"  She has been in counseling with various therapists on a few occasions. She was most recently in therapy in 2017/2018 (consistently, and this was helpful; attended weekly sessions). Client has tried \"Better [facility]\" recently but didn't like this format (rajat based with a ). She has also talked to an \"astrologist \" in the past. Psychiatric Hospitalizations: None. Client denies a history of civil commitment. Client is not receiving other mental health services. Client was prescribed Adderall by PCP to take until the evaluation (she only took it twice but was concerned about taking it until testing was completed). Client noted she is concerned about getting addicted to medications (she has heard about this possibility).    Client did report a family history of substance use concerns; see medical history section for details. Her mother was in treatment for alcohol abuse. Client noticed that when her drinking was at its worst, she had bad anxiety as well. " "Client has not received chemical dependency treatment in the past.  Client has not ever been to detox. Client thinks she drinks too much. She reports using alcohol4-5 times per week and has 2-5 drinks or glasses of wine at a time. Client reports heaviest use was college/grad school. She will drink more on the weekends or at an event (go out to a meal with friends). She feels she can go without drinking, but she enjoys wine and drinking with friends. She feels she doesn't control it well. She will over imbibe and doesn't know when to stop. She stated, \"I equate a night being over with whether there are drinks left.\" Client is concerned about following in her mother's footsteps and she wants to cut back on her drinking/reduce drinking. She thinks that she drinks to cope with stress/anxiety at the end of the day. She uses drinking to \"sada\" that the day is over and that it is time to relax (if she doesn't have a drink, then she may still \"go into a productive kick\" but if she has a drink, then it is an excuse to turn things off). She might over drink in social situations to feel more comfortable. Client also reports using cannabis 3-5 times per week and has one edible at a time. Client reports heaviest use is current use. (in the last year).    Client reported they grew up in [facility]. She was raised by her biological parents. Her parents were always together.  She was the third born of three children. She has an older brother and an older sister. Client reported that their childhood was \"kee.\" Her parents were \"great\" and they lived in a nice neighborhood and had a good education and were given opportunities and possibilities to participate in things they wanted to try. They had all of their needs met and felt loved and safe. Client described their current relationships with family of origin as close. Client reported the following relationship history: one relationship (had been together for 1.5 years). She " "stated, \"They just don't seem to go well for me.\"  Client's current relationship status is single for about a year. She stated, \"I was cheated on last year; it hasn't been a clean break.\"  She was cheated on in her past relationship. Client identified their sexual orientation as heterosexual. She does not have children. Client identified siblings and parents as part of their support system.  Client identified the quality of these relationships as good.     As a child, client reported that she had problems with organization and keeping track of items, misplaced or lost things and displayed argumentative or oppositional behaviors. She would set up her clothes on the ground the night before. She also had piles of clothes in her room and her room was messy. She slept on half of her bed because the other half was full of things. Client reported no difficulty with childhood peer relationships. She felt opinionated and 'sensitive.' She felt she did better with one on one interactions with other kids versus group settings. She felt sensitive if she felt \"left out.\" As a child, client reported having sleep disturbance, including: daytime drowsiness / fatigue . Client liked to stay up late and watch TV. She was a \"late night person.\" It was hard to get out of bed in the morning. She drank Red Bull on the way to school in the morning in high school. She would sometimes nap in the middle of the day in her senior year (when her schedule allowed). Client reported currently experiencing sleep disturbance, including: snoring. She stays up late sometimes. Because she doesn't have a routine in place (unemployed) she can stay up late and sleep in. She might take a sleeping aid to help her fall asleep. Client reported sleeping approximately 7-9 hours per night. She gets up to use the restroom at least once per night. It is hard to fall asleep on her own (she is usually looking at her phone or watching something to fall asleep). Client " "reported that she has not completed a sleep study.      Client's highest education level was graduate school. Client graduated high school in 2005. She went to a private [employer]. She estimated she obtained mostly As. She stated, \"I was a good student unless it was a subject I didn't like.\" During the elementary, middle, and high school years, patient recalls academic strengths in the area of reading, writing and social studies and theater. Client reported experiencing academic problems in science and math. Client did not identify any learning problems.  Client did not receive tutoring services during the school years. Client did not receive special education services. Client reported significant behavior and discipline problems including: disruptive classroom behavior and failure to finish or complete homework. She was talkative and disruptive in school. She noted that she was \"always combative\" in her [employer] classes (she liked learning about beliefs but was outspoken). She would point out the hypocrisy. Client thinks she was able to do \"fine\" in school and \"get by\" but she procrastinated a lot and then would write papers overnight. In class, she would be looking at her phone or computer and was \"zoned out\" and not listening. She stated, \"I was smart enough to know how to do well. I have overcompensated at times. There were better ways I could have studied and learned things, but I just didn't.\" She was always told that she was the \"smart one.\" She was in the \"gifted programs\" at school. Her mom fought for her to be in this program when she was in elementary school. She was told she was a high achiever and was expected to be one. Client did attend post-secondary school.  She graduated from college in 2009 from [employer] in [place] with a degree in anthropology and I/O psychology. She reported that she had a 3.7 GPA and obtained mostly As and Bs. 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 Client noted that this was \"the most productive time of my life.\" She was busy and kept a paper calendar and wrote everything down. She stated, \"It was deadline oriented and you couldn't get off track, you had to get things done.\" When her time was \"spoken for\" she was able to get things done on time. She has her master's degree in development practice (looking at developing countries and why some systems aren't working). She noted that she procrastinated and did all of her work at the last minute in graduate school. She felt this program was \"more lenient and you are treated as an adult going to school.\" She was involved in more clubs during this time and didn't prioritize her assignments. Client noted that because her graduate work was more self-driven, she really struggled to follow through and get work done.     Client reported that she is not currently employed. She isn't sure what she wants to do for work and wants to figure this out. 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 She is good at the tactical portion of completing the work (hands-on actions) She has talked to publishers about writing a book. However, she worries about others' future opinions of her work. Client reported that she often been late in completing projects, been in conflict with boss / co-workers, disorganized behavior and distractible behavior. 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 She marketed a film on social entrepreneurship. She quit these jobs to travel the world and ask people the same 3 questions and write a book about it. She went to 40 countries and asked over 700 people the same 3 questions. 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 She was motivated to do the work, but she hasn't finished the book/writing. She has a manuscript that is over 100,000 words and she sherine worked on it extensively with editors and publishers, but it is not finished. She will go back to a paragraph and pull something out and reorganize it. It brings her into a \"weird depression\" whenever she works on it, so she avoids it. She did a crowd-funding effort to pre-fund the book. She made a completed activity book for kids and she has websites for these things. She doesn't always finish creative projects. She has another book (a \"coming of age\" memoir called \"Underemployed and Single\" that she is excited about. She stated, \"In my dream world, I'm this awesome author, but I'm not following through.\" She is living in her parents' basement and she isn't sure why she can't finish her work and put it out. Her parents want her to just complete it, but they also empathize with her and support her. During COVID, she worked at the [employer] for one year. She worked with an organizer for 5 months, but she was \"too opinionated\" and spoke out about her grievances (e.g., they worked without contracts, etc.).She loved this work because she liked the instant gratification of organization and tidying things and labeling things. She is interested in " "working jobs that apply efforts to making poor countries stronger. She thinks she has chosen paths that are \"less tangible and that don't have direct career paths.\" She hasn't necessarily wanted to go to where she could be working, and she is very critical of the industry and thinks that rich people want to \"do good\" but they end up causing more issues (e.g., she is critical of mission trips and feels they are patronizing and paternalistic to people on the ground). She has looked at getting a full-time job, but she feels it is \"giving up on myself or cheating on myself.\" She has some financial \"cushion\" for a bit longer. The longest period of employment has been 3 years. She has been \"so inconsistent.\" Client has not been terminated from a place of employment (she has always had conversations with her bosses when things weren't working).      Results of testing were suggestive of ADHD. Rating scales suggested the client is experiencing symptoms of inattention that have been present since childhood. Impairment is reported in more than one setting (school, work, and at home).  Results of testing also suggested the client has deficits in executive functioning that are likely to be due to ADHD.  Personality testing was positive for anxiety and distractibility. Client s responses on self-report measures suggest mild anxiety and moderate depression. Finally, cognitive testing revealed significant differences between Client's index scores which is suggestive of deficits in attention and concentration. Based on the results of clinical interview and psychological testing, the client currently meets criteria for a diagnosis of Attention-Deficit/Hyperactivity Disorder, Combined Presentation, Major Depressive Disorder, Recurrent, Mild and Other Specified Anxiety Disorder. Client reported that she tends to  over imbibe  when it comes to alcohol and she feels she doesn t control her drinking well. A diagnosis of  Alcohol Use " Disorder  will be included as a  rule out  condition to be further observed and assessed by Client and her treatment team. Client will be provided with the results of testing, diagnosis, and recommendations in her last appointment.    DSM5 Diagnoses: (Sustained by DSM5 Criteria Listed Above)    Attention-Deficit/Hyperactivity Disorder, Combined Presentation (F90.2)    Major Depressive Disorder, Recurrent, Mild (F33.0)    Other Specified Anxiety Disorder (F41.9)    RULE OUT: Alcohol Use Disorder    Psychosocial & Contextual Factors: career stress    Recommendations:    1. Schedule a medication evaluation with your physician. Medications are often beneficial in treating anxiety and depression symptoms, as well as ADHD. It will be important that each condition is treated, as treatment for one without the other may lead to an increase in symptoms (e.g., treating ADHD, but not anxiety, can lead to increased anxiety).    2. Access resources through websites, books, and articles such as those provided in the Adult ADHD Symptom Management handout.      3. Consider working with an ADHD  or individual therapist to learn skills to assist with symptom management, as well as ways to improve relationships, etc. that may have been impacted by your symptoms.    4. Individual therapy is recommended. Therapies focused on identifying and challenging problematic thought and behavior patterns while increasing the use of healthy coping skills has been found to be effective in treating anxiety and depression. It will be important to set goals in this therapy and work actively toward achieving short-term successes that lead to the completion of each goal. Action-oriented therapies, such as CBT and ACT are particularly recommended for the treatment of chronic anxiety and depression.    5. The use of behavioral strategies such as diaphragmatic breathing, guided imagery, and mindfulness is often helpful in the management of anxiety  symptoms.    6. Schedule a follow-up appointment with me in about six weeks to review symptoms, treatment involvement, and struggles and/or successes.       Alberta Webb, Ph.D.,   Licensed Psychologist

## 2022-12-14 NOTE — PROGRESS NOTES
"Client Name: Julia Boss  Date: 12/14/22     Service Type: Individual (ADHD Evaluation feedback session)     Session Start Time: 9:00 Session End Time: 9:45      Session Length: 45 minutes      Session #: (feedback)     Attendees: Client attended alone    The patient has been notified of the following:      \"We have found that certain health care needs can be provided without the need for a face to face visit.  This service lets us provide the care you need with a phone conversation.       I will have full access to your [facility] medical record during this entire phone call.   I will be taking notes for your medical record.      Since this is like an office visit, we will bill your insurance company for this service.       There are potential benefits and risks of telephone visits (e.g. limits to patient confidentiality) that differ from in-person visits.? Confidentiality still applies for telephone services, and nobody will record the visit.  It is important to be in a quiet, private space that is free of distractions (including cell phone or other devices) during the visit.??      If during the course of the call I believe a telephone visit is not appropriate, you will not be charged for this service\"     Consent has been obtained for this service by care team member: Yes        DATA        Treatment Objective(s) Addressed in This Session:   Provided feedback on ADHD evaluation. Reviewed test results in depth and answered client's questions. Client diagnosed with of Attention-Deficit/Hyperactivity Disorder, Combined Presentation, Major Depressive Disorder, Recurrent, Mild and Other Specified Anxiety Disorder. A diagnosis of Alcohol Use Disorder is being included as a  Rule Out.  Reviewed ADHD Coping Skills Handout. This provider also completed full written report of evaluation, including integration of testing data, summary, and recommendations. Please see Documentation Only dated 12/14/22.     Progress on / " Status of Treatment Objective(s) / Homework:   Completed      Intervention:  ADHD Evaluation feedback; Reviewed report (can be found in Documentation Only encounter dated 12/14/22); Client was appreciative of the feedback and expressed understanding of the diagnoses.         ASSESSMENT: Current Emotional / Mental Status (status of significant symptoms):  Risk status (Self / Other harm or suicidal ideation)  Client denies current fears or concerns for personal safety.  Client denies current or recent suicidal ideation or behaviors.  Client denies current or recent homicidal ideation or behaviors.  Client denies current or recent self-injurious behavior or ideation.  Client denies other safety concerns.  A safety and risk management plan has not been developed at this time, however client was given the after-hours number / 911 should there be a change in any of these risk factors.     Appearance: Unable to assess on phone  Eye Contact: Unable to assess on phone  Psychomotor Behavior: Unable to assess on phone  Attitude: Cooperative   Orientation: All  Speech  Rate / Production: Normal   Volume: Normal   Mood: Normal  Affect: Appropriate   Thought Content: Clear   Thought Form: Coherent Logical   Insight: Good      Medication Review:  Client is prescribed Adderall by PCP    Medication Compliance:  Yes     Changes in Health Issues:  None reported     Chemical Use Review:  Substance Use: Chemical use reviewed, no active concerns      Tobacco Use: No current tobacco use.      Collateral Reports Completed:  Routed note to Care Team Member(s)     PLAN: (Homework, other)     Recommendations:    1. Schedule a medication evaluation with your physician. Medications are often beneficial in treating anxiety and depression symptoms, as well as ADHD. It will be important that each condition is treated, as treatment for one without the other may lead to an increase in symptoms (e.g., treating ADHD, but not anxiety, can lead to  increased anxiety).    2. Access resources through websites, books, and articles such as those provided in the Adult ADHD Symptom Management handout.      3. Consider working with an ADHD  or individual therapist to learn skills to assist with symptom management, as well as ways to improve relationships, etc. that may have been impacted by your symptoms.    4. Individual therapy is recommended. Therapies focused on identifying and challenging problematic thought and behavior patterns while increasing the use of healthy coping skills has been found to be effective in treating anxiety and depression. It will be important to set goals in this therapy and work actively toward achieving short-term successes that lead to the completion of each goal. Action-oriented therapies, such as CBT and ACT are particularly recommended for the treatment of chronic anxiety and depression.    5. The use of behavioral strategies such as diaphragmatic breathing, guided imagery, and mindfulness is often helpful in the management of anxiety symptoms.    6. Schedule a follow-up appointment with me in about six weeks to review symptoms, treatment involvement, and struggles and/or successes.       Alberta Webb, Ph.D.,   Licensed Psychologist     Psychological Testing   Billing/Services Summary       Testing Evaluation Services Base: 88144  (1st 60 mins) Add-on: 66179  (each addtl 60 mins)   Record Review and Clarify Referral Question   (8:40/9:00), (11/8/22) 20 minutes   Integration/Report Generation   (11:00/12:00), (11/16/22) - Albaro Scales  (12:00/1:00), (11/17/22) - MMPI-2  (11:00/1:00), (12/8/22) - WAIS Test Administration and Scoring/Interpretation  (7:30/8:30), (12/14/22) - Report 60 minutes  60 minutes  120 minutes  60 minutes   (9:00/9:45), (12/14/22) - Interactive Feedback session  45 minutes   Total Time: 365 minutes (6 hours and 05 minutes)    Total Units: 1 5           Diagnosis(es):  (ICD-10)  Attention-Deficit/Hyperactivity Disorder, Combined Presentation (F90.2)  Major Depressive Disorder, Recurrent, Mild (F33.0)  Other Specified Anxiety Disorder (F41.9)  RULE OUT: Alcohol Use Disorder

## 2023-01-10 ENCOUNTER — VIRTUAL VISIT (OUTPATIENT)
Dept: PSYCHOLOGY | Facility: CLINIC | Age: 38
End: 2023-01-10
Payer: COMMERCIAL

## 2023-01-10 DIAGNOSIS — F41.9 ANXIETY: ICD-10-CM

## 2023-01-10 DIAGNOSIS — F33.0 MAJOR DEPRESSIVE DISORDER, RECURRENT EPISODE, MILD (H): ICD-10-CM

## 2023-01-10 DIAGNOSIS — F90.2 ATTENTION DEFICIT HYPERACTIVITY DISORDER, COMBINED TYPE: ICD-10-CM

## 2023-01-10 PROCEDURE — 90834 PSYTX W PT 45 MINUTES: CPT | Mod: 95

## 2023-01-10 ASSESSMENT — PATIENT HEALTH QUESTIONNAIRE - PHQ9
SUM OF ALL RESPONSES TO PHQ QUESTIONS 1-9: 1
SUM OF ALL RESPONSES TO PHQ QUESTIONS 1-9: 1

## 2023-01-10 NOTE — PROGRESS NOTES
[facility]                                     Progress Note    Patient Name: Julia Boss  Date: 1/10/23         Service Type: Individual      Session Start Time: 11:00 am  Session End Time: 11:45 am     Session Length: 45 min    Session #: 1    Attendees: Client attended alone    Service Modality:  Video Visit:      Provider verified identity through the following two step process.  Patient provided:  Patient address    Telemedicine Visit: The patient's condition can be safely assessed and treated via synchronous audio and visual telemedicine encounter.      Reason for Telemedicine Visit: Patient has requested telehealth visit    Originating Site (Patient Location): Patient's home    Distant Site (Provider Location): Provider Remote Setting- Home Office    Consent:  The patient/guardian has verbally consented to: the potential risks and benefits of telemedicine (video visit) versus in person care; bill my insurance or make self-payment for services provided; and responsibility for payment of non-covered services.     Patient would like the video invitation sent by:  My Chart    Mode of Communication:  Video Conference via [facility]    Distant Location (Provider):  Off-site    As the provider I attest to compliance with applicable laws and regulations related to telemedicine.    DATA  Interactive Complexity: No  Crisis: No        Progress Since Last Session (Related to Symptoms / Goals / Homework):   Symptoms: No change first session    Homework: Did not complete      Episode of Care Goals: No improvement - PRECONTEMPLATION (Not seeing need for change); Intervened by educating the patient about the effects of current behavior on health.  Evoked information about reasons to continue behavior, express concern / recommendations, and explored any change talk     Current / Ongoing Stressors and Concerns:  Recovering from relationship breakup, struggles with romantic and friend relationships,  inappropriate self disclosure. Disrupted sleep. Seeking skills to address ADHD task-workload management-prioritization-hyperfocus. Recognizing perfectionism gets in the way of progress, for example with writing and completing books.        Treatment Objective(s) Addressed in This Session:   Improve quantity and quality of night time sleep / decrease daytime naps     Intervention:   Provided psychoeducation on sleep hygieine.  Provided active listening and validation.     Assessments completed prior to visit:  The following assessments were completed by patient for this visit:  PHQ2:   PHQ-2 ( 1999 [employer]) 8/29/2022 8/26/2022 8/27/2021 11/25/2019 2/29/2016   Q1: Little interest or pleasure in doing things 0 0 0 0 0   Q2: Feeling down, depressed or hopeless 0 0 0 0 0   PHQ-2 Score 0 0 0 0 0   PHQ-2 Total Score (12-17 Years)- Positive if 3 or more points; Administer PHQ-A if positive - - 0 0 -   Q1: Little interest or pleasure in doing things - Not at all - - -   Q2: Feeling down, depressed or hopeless - Not at all - - -   PHQ-2 Score - 0 - - -     PHQ9:   PHQ-9 SCORE 11/25/2019 8/11/2020 8/27/2021 5/24/2022 8/29/2022 11/7/2022 1/10/2023   PHQ-9 Total Score MyChart - - - 6 (Mild depression) - 1 (Minimal depression) 1 (Minimal depression)   PHQ-9 Total Score 0 2 0 6 3 1 1     GAD2:   ASAEL-2 11/7/2022 1/10/2023   Feeling nervous, anxious, or on edge 1 0   Not being able to stop or control worrying 1 0   ASAEL-2 Total Score 2 0     GAD7:   ASAEL-7 SCORE 9/21/2017 10/29/2018 11/25/2019 8/11/2020 8/27/2021 8/29/2022 11/8/2022   Total Score 1 5 3 1 0 0 4     CAGE-AID:   CAGE-AID Total Score 11/7/2022   Total Score 3   Total Score MyChart 3 (A total score of 2 or greater is considered clinically significant)     PROMIS 10-Global Health (all questions and answers displayed):   PROMIS 10 1/10/2023   In general, would you say your health is: Good   In general, would you say your quality of life is: Very good   In general, how would  you rate your physical health? Good   In general, how would you rate your mental health, including your mood and your ability to think? Fair   In general, how would you rate your satisfaction with your social activities and relationships? Fair   In general, please rate how well you carry out your usual social activities and roles Fair   To what extent are you able to carry out your everyday physical activities such as walking, climbing stairs, carrying groceries, or moving a chair? Completely   How often have you been bothered by emotional problems such as feeling anxious, depressed or irritable? Sometimes   How would you rate your fatigue on average? Mild   How would you rate your pain on average?   0 = No Pain  to  10 = Worst Imaginable Pain 0   In general, would you say your health is: 3   In general, would you say your quality of life is: 4   In general, how would you rate your physical health? 3   In general, how would you rate your mental health, including your mood and your ability to think? 2   In general, how would you rate your satisfaction with your social activities and relationships? 2   In general, please rate how well you carry out your usual social activities and roles. (This includes activities at home, at work and in your community, and responsibilities as a parent, child, spouse, employee, friend, etc.) 2   To what extent are you able to carry out your everyday physical activities such as walking, climbing stairs, carrying groceries, or moving a chair? 5   In the past 7 days, how often have you been bothered by emotional problems such as feeling anxious, depressed, or irritable? 3   In the past 7 days, how would you rate your fatigue on average? 2   In the past 7 days, how would you rate your pain on average, where 0 means no pain, and 10 means worst imaginable pain? 0   Global Mental Health Score 11   Global Physical Health Score 17   PROMIS TOTAL - SUBSCORES 28   Some recent data might be hidden      [place] Suicide Severity Rating Scale (Lifetime/Recent)  [place] Suicide Severity Rating (Lifetime/Recent) 11/8/2022   1. Wish to be Dead (Lifetime) 0   2. Non-Specific Active Suicidal Thoughts (Lifetime) 0   Actual Attempt (Lifetime) 0   Has subject engaged in non-suicidal self-injurious behavior? (Lifetime) 0   Interrupted Attempts (Lifetime) 0   Aborted or Self-Interrupted Attempt (Lifetime) 0   Preparatory Acts or Behavior (Lifetime) 0   Calculated C-SSRS Risk Score (Lifetime/Recent) No Risk Indicated         ASSESSMENT: Current Emotional / Mental Status (status of significant symptoms):   Risk status (Self / Other harm or suicidal ideation)   Patient denies current fears or concerns for personal safety.   Patient denies current or recent suicidal ideation or behaviors.   Patient denies current or recent homicidal ideation or behaviors.   Patient denies current or recent self injurious behavior or ideation.   Patient denies other safety concerns.   Patient reports there has been no change in risk factors since their last session.     Patient reports there has been no change in protective factors since their last session.     Recommended that patient call 911 or go to the local ED should there be a change in any of these risk factors.     Appearance:   Appropriate    Eye Contact:   Good    Psychomotor Behavior: Normal  Restless    Attitude:   Cooperative  Friendly   Orientation:   All   Speech    Rate / Production: Normal     Volume:  Normal    Mood:    Anxious  Normal   Affect:    Appropriate  Expansive    Thought Content:  Clear    Thought Form:  Coherent  Goal Directed  Logical    Insight:    Good      Medication Review:   No changes to current psychiatric medication(s)     Medication Compliance:   No Not taking Adderall. Has concerns about side effects.      Changes in Health Issues:   None reported     Chemical Use Review:   Substance Use: Chemical use reviewed, no active concerns identified       Tobacco Use: No current tobacco use.      Diagnosis:  1. Anxiety    2. Major depressive disorder, recurrent episode, mild (H)    3. Attention deficit hyperactivity disorder, combined type        Collateral Reports Completed:   Not Applicable    PLAN: (Patient Tasks / Therapist Tasks / Other)    Sleep hygiene      MICAH Fonseca 1/10/23                                                         ______________________________________________________________________

## 2023-01-31 ENCOUNTER — OFFICE VISIT (OUTPATIENT)
Dept: FAMILY MEDICINE | Facility: CLINIC | Age: 38
End: 2023-01-31
Payer: COMMERCIAL

## 2023-01-31 VITALS
DIASTOLIC BLOOD PRESSURE: 64 MMHG | HEIGHT: 66 IN | SYSTOLIC BLOOD PRESSURE: 101 MMHG | HEART RATE: 73 BPM | WEIGHT: 172 LBS | RESPIRATION RATE: 16 BRPM | BODY MASS INDEX: 27.64 KG/M2 | OXYGEN SATURATION: 98 %

## 2023-01-31 DIAGNOSIS — L85.3 XEROSIS OF SKIN: ICD-10-CM

## 2023-01-31 DIAGNOSIS — N89.8 FOUL SMELLING VAGINAL DISCHARGE: ICD-10-CM

## 2023-01-31 DIAGNOSIS — R63.5 WEIGHT GAIN: ICD-10-CM

## 2023-01-31 DIAGNOSIS — F90.9 ATTENTION DEFICIT HYPERACTIVITY DISORDER (ADHD), UNSPECIFIED ADHD TYPE: Primary | ICD-10-CM

## 2023-01-31 DIAGNOSIS — F32.5 MAJOR DEPRESSIVE DISORDER IN FULL REMISSION, UNSPECIFIED WHETHER RECURRENT (H): ICD-10-CM

## 2023-01-31 DIAGNOSIS — Z11.3 ENCOUNTER FOR SCREENING EXAMINATION FOR SEXUALLY TRANSMITTED DISEASE: ICD-10-CM

## 2023-01-31 LAB
ALBUMIN SERPL BCG-MCNC: 4.1 G/DL (ref 3.5–5.2)
ALP SERPL-CCNC: 44 U/L (ref 35–104)
ALT SERPL W P-5'-P-CCNC: 17 U/L (ref 10–35)
ANION GAP SERPL CALCULATED.3IONS-SCNC: 12 MMOL/L (ref 7–15)
AST SERPL W P-5'-P-CCNC: 25 U/L (ref 10–35)
BILIRUB SERPL-MCNC: 0.4 MG/DL
BUN SERPL-MCNC: 15.8 MG/DL (ref 6–20)
CALCIUM SERPL-MCNC: 9.2 MG/DL (ref 8.6–10)
CHLORIDE SERPL-SCNC: 103 MMOL/L (ref 98–107)
CLUE CELLS: ABNORMAL
CREAT SERPL-MCNC: 0.73 MG/DL (ref 0.51–0.95)
DEPRECATED HCO3 PLAS-SCNC: 22 MMOL/L (ref 22–29)
ERYTHROCYTE [DISTWIDTH] IN BLOOD BY AUTOMATED COUNT: 12.4 % (ref 10–15)
GFR SERPL CREATININE-BSD FRML MDRD: >90 ML/MIN/1.73M2
GLUCOSE SERPL-MCNC: 95 MG/DL (ref 70–99)
HCT VFR BLD AUTO: 36.8 % (ref 35–47)
HGB BLD-MCNC: 12.1 G/DL (ref 11.7–15.7)
MCH RBC QN AUTO: 30.4 PG (ref 26.5–33)
MCHC RBC AUTO-ENTMCNC: 32.9 G/DL (ref 31.5–36.5)
MCV RBC AUTO: 93 FL (ref 78–100)
PLATELET # BLD AUTO: 207 10E3/UL (ref 150–450)
POTASSIUM SERPL-SCNC: 4.3 MMOL/L (ref 3.4–5.3)
PROT SERPL-MCNC: 6.8 G/DL (ref 6.4–8.3)
RBC # BLD AUTO: 3.98 10E6/UL (ref 3.8–5.2)
SODIUM SERPL-SCNC: 137 MMOL/L (ref 136–145)
TRICHOMONAS, WET PREP: ABNORMAL
TSH SERPL DL<=0.005 MIU/L-ACNC: 1.46 UIU/ML (ref 0.3–4.2)
WBC # BLD AUTO: 8.2 10E3/UL (ref 4–11)
WBC'S/HIGH POWER FIELD, WET PREP: ABNORMAL
YEAST, WET PREP: ABNORMAL

## 2023-01-31 PROCEDURE — 86709 HEPATITIS A IGM ANTIBODY: CPT | Performed by: INTERNAL MEDICINE

## 2023-01-31 PROCEDURE — 87591 N.GONORRHOEAE DNA AMP PROB: CPT | Performed by: INTERNAL MEDICINE

## 2023-01-31 PROCEDURE — 84443 ASSAY THYROID STIM HORMONE: CPT | Performed by: INTERNAL MEDICINE

## 2023-01-31 PROCEDURE — 87389 HIV-1 AG W/HIV-1&-2 AB AG IA: CPT | Performed by: INTERNAL MEDICINE

## 2023-01-31 PROCEDURE — 87491 CHLMYD TRACH DNA AMP PROBE: CPT | Performed by: INTERNAL MEDICINE

## 2023-01-31 PROCEDURE — 80053 COMPREHEN METABOLIC PANEL: CPT | Performed by: INTERNAL MEDICINE

## 2023-01-31 PROCEDURE — 86803 HEPATITIS C AB TEST: CPT | Performed by: INTERNAL MEDICINE

## 2023-01-31 PROCEDURE — 87340 HEPATITIS B SURFACE AG IA: CPT | Performed by: INTERNAL MEDICINE

## 2023-01-31 PROCEDURE — 99215 OFFICE O/P EST HI 40 MIN: CPT | Performed by: INTERNAL MEDICINE

## 2023-01-31 PROCEDURE — 36415 COLL VENOUS BLD VENIPUNCTURE: CPT | Performed by: INTERNAL MEDICINE

## 2023-01-31 PROCEDURE — 85027 COMPLETE CBC AUTOMATED: CPT | Performed by: INTERNAL MEDICINE

## 2023-01-31 PROCEDURE — 87210 SMEAR WET MOUNT SALINE/INK: CPT | Performed by: INTERNAL MEDICINE

## 2023-01-31 RX ORDER — CLOTRIMAZOLE 1 %
1 CREAM WITH APPLICATOR VAGINAL AT BEDTIME
Qty: 45 G | Refills: 0 | Status: SHIPPED | OUTPATIENT
Start: 2023-01-31

## 2023-01-31 RX ORDER — CERAMIDE 1,3,6-II/SALICYLIC/B3
1 CLEANSER (ML) TOPICAL 2 TIMES DAILY
Qty: 453 G | Refills: 1 | Status: SHIPPED | OUTPATIENT
Start: 2023-01-31

## 2023-01-31 RX ORDER — ATOMOXETINE 40 MG/1
40 CAPSULE ORAL DAILY
Qty: 60 CAPSULE | Refills: 0 | Status: SHIPPED | OUTPATIENT
Start: 2023-01-31

## 2023-01-31 ASSESSMENT — PAIN SCALES - GENERAL: PAINLEVEL: NO PAIN (0)

## 2023-01-31 NOTE — PATIENT INSTRUCTIONS
As discussed , started on Strattera - please follow the instruction.     Please do the baseline labs ordered    =============================

## 2023-01-31 NOTE — PROGRESS NOTES
Assessment and Plan  1. Attention deficit hyperactivity disorder (ADHD), unspecified ADHD type  Ongoing problem, Uncontrolled. As per review of psychotherapy note with the patient undergoing therapy for ADHD , Anxiety depression too discussed most part of the appointment on the choices of start of ADHD medication.   - Pt was on low dose of ADHD medication Adderall which didn't help her much. Pt requesting for not to be on addictive therapy and shared decision to start on Strattera as prescribed as she already tried and failed Wellbutrin in the past for anxiety which didn't help her much.   - Discussed on need of doing baseline labs before the start of medication as Strattera does causes hepatotoxicity.   - atomoxetine (STRATTERA) 40 MG capsule; Take 1 capsule (40 mg) by mouth daily for 3 days.  On day four increase to 80 mg PO daily  Dispense: 60 capsule; Refill: 0  - TSH with free T4 reflex; Future  - Comprehensive metabolic panel (BMP + Alb, Alk Phos, ALT, AST, Total. Bili, TP); Future  - CBC with platelets; Future  - TSH with free T4 reflex  - Comprehensive metabolic panel (BMP + Alb, Alk Phos, ALT, AST, Total. Bili, TP)  - CBC with platelets    2. Major depressive disorder in full remission, unspecified whether recurrent (H)  3. Weight gain  - TSH with free T4 reflex; Future  - TSH with free T4 reflex    4. Xerosis of skin  Ongoing problem, with pt tried and failed Steroid creams in the past. Discussed on the presentation of dry skin on the extremities and all over the body mostly , will try basic moisturizer cream before planning dermatology if no improvement. Pt understood and agreed with the plan.   - Emollient (CERAVE MOISTURIZING) CREA; Externally apply 1 each topically 2 times daily  Dispense: 453 g; Refill: 1    5. Encounter for screening examination for sexually transmitted disease  - HIV Antigen Antibody Combo; Future  - Hepatitis C Screen Reflex to HCV RNA Quant and Genotype; Future  - Hepatitis B  surface antigen; Future  - Hepatitis A antibody IgM; Future  - NEISSERIA GONORRHOEA PCR; Future  - CHLAMYDIA TRACHOMATIS PCR; Future  - HIV Antigen Antibody Combo  - Hepatitis C Screen Reflex to HCV RNA Quant and Genotype  - Hepatitis B surface antigen  - Hepatitis A antibody IgM  - NEISSERIA GONORRHOEA PCR  - CHLAMYDIA TRACHOMATIS PCR    6. Foul smelling vaginal discharge  Pt has been trying unspecified OTC vaginal suppositories? Which we discussed on treating with more evidence based work up. Will check Wet prep and do further recommendations.   UPDATE - Vaginal swab was positive for only white cells but no bacteria or parasites isolated.  Will treat this empirically as possibility of yeast causing symptoms.  - Wet prep - lab collect; Future  - Wet prep - lab collect  - clotrimazole (LOTRIMIN) 1 % vaginal cream; Place 1 Applicatorful vaginally At Bedtime For 7 days  Dispense: 45 g; Refill: 0      Over 40 minutes spent on reviewing patient chart,  face to face encounter, greater than 50% time spent with plan/cordination of care and documentation as above in my A/P.            Patient Instructions   As discussed , started on Strattera - please follow the instruction.     Please do the baseline labs ordered    =============================                Return in about 4 weeks (around 2/28/2023), or if symptoms worsen or fail to improve, for E-Visit.    Tasha Mosqueda MD  [facility] OBED Dumont is a 37 year old, presenting for the following health issues:  Establish Care and Follow Up (ADHD new medicine)      History of Present Illness       Reason for visit:  ADHD medicine, introduction to a general practitionerShe consumes 1 sweetened beverage(s) daily.She exercises with enough effort to increase her heart rate 20 to 29 minutes per day.  She exercises with enough effort to increase her heart rate 4 days per week.     Pt is new to me, last seen our group on 5/2022 for  ADHD evalution .She was on Adderall and here for follow up, She is opting to establish care.      No Known Allergies     Past Medical History:   Diagnosis Date     Acne, unspecified acne type 08/31/2016     Anxiety 08/31/2016     Cervical high risk HPV (human papillomavirus) test positive     2016, 2017, 2018, 2019, 2020, 2021, 2022     Encounter for IUD insertion 10/12/2017    Kyleena inserted by Dr Ricci     Herpes simplex vulvovaginitis 02/2016    PCR confirmed vulvovaginal HSV-1     History of vaccination against human papillomavirus     completed     Moderate episode of recurrent major depressive disorder (H) 08/31/2016     Overweight 08/25/2010       Past Surgical History:   Procedure Laterality Date     NO HISTORY OF SURGERY         Family History   Problem Relation Age of Onset     Anxiety Disorder Mother      Substance Abuse Mother      Thyroid Disease Mother      No Known Problems Father      No Known Problems Maternal Grandmother      No Known Problems Maternal Grandfather      No Known Problems Paternal Grandmother      No Known Problems Paternal Grandfather      Substance Abuse Brother      No Known Problems Sister      No Known Problems Other        Social History     Tobacco Use     Smoking status: Never     Smokeless tobacco: Never   Substance Use Topics     Alcohol use: Yes     Alcohol/week: 0.0 standard drinks     Comment: socially        Current Outpatient Medications   Medication     atomoxetine (STRATTERA) 40 MG capsule     clotrimazole (LOTRIMIN) 1 % vaginal cream     drospirenone-ethinyl estradiol (DESHAWN) 3-0.02 MG tablet     Emollient (CERAVE MOISTURIZING) CREA     No current facility-administered medications for this visit.        Review of Systems   Constitutional, HEENT, cardiovascular, pulmonary, GI, , musculoskeletal, neuro, skin, endocrine and psych systems are negative, except as otherwise noted.        Objective    /64 (BP Location: Left arm, Patient Position: Sitting, Cuff  "Size: Adult Large)   Pulse 73   Resp 16   Ht 1.676 m (5' 6\")   Wt 78 kg (172 lb)   SpO2 98%   BMI 27.76 kg/m    Body mass index is 27.76 kg/m .  Physical Exam   GENERAL: healthy, alert and no distress  NECK: no adenopathy, no asymmetry, masses, or scars and thyroid normal to palpation  RESP: lungs clear to auscultation - no rales, rhonchi or wheezes  CV: regular rate and rhythm, normal S1 S2, no S3 or S4, no murmur, click or rub, no peripheral edema and peripheral pulses strong  ABDOMEN: soft, nontender, no hepatosplenomegaly, no masses and bowel sounds normal  MS: no gross musculoskeletal defects noted, no edema    "

## 2023-02-01 ENCOUNTER — VIRTUAL VISIT (OUTPATIENT)
Dept: PSYCHOLOGY | Facility: CLINIC | Age: 38
End: 2023-02-01
Payer: COMMERCIAL

## 2023-02-01 DIAGNOSIS — F90.2 ATTENTION DEFICIT HYPERACTIVITY DISORDER, COMBINED TYPE: Primary | ICD-10-CM

## 2023-02-01 LAB
C TRACH DNA SPEC QL NAA+PROBE: NEGATIVE
HAV IGM SERPL QL IA: NONREACTIVE
HBV SURFACE AG SERPL QL IA: NONREACTIVE
HCV AB SERPL QL IA: NONREACTIVE
HIV 1+2 AB+HIV1 P24 AG SERPL QL IA: NONREACTIVE
N GONORRHOEA DNA SPEC QL NAA+PROBE: NEGATIVE

## 2023-02-01 PROCEDURE — 90834 PSYTX W PT 45 MINUTES: CPT | Mod: 95

## 2023-02-01 NOTE — PROGRESS NOTES
[facility]                                     Progress Note    Patient Name: Julia Boss  Date: 2/1/23         Service Type: Individual      Session Start Time: 4:00 pm  Session End Time: 4:45 pm     Session Length: 45 min    Session #: 2    Attendees: Client attended alone    Service Modality:  Video Visit:      Provider verified identity through the following two step process.  Patient provided:  Patient address    Telemedicine Visit: The patient's condition can be safely assessed and treated via synchronous audio and visual telemedicine encounter.      Reason for Telemedicine Visit: Patient has requested telehealth visit    Originating Site (Patient Location): Patient's home    Distant Site (Provider Location): Provider Remote Setting- Home Office    Consent:  The patient/guardian has verbally consented to: the potential risks and benefits of telemedicine (video visit) versus in person care; bill my insurance or make self-payment for services provided; and responsibility for payment of non-covered services.     Patient would like the video invitation sent by:  My Chart    Mode of Communication:  Video Conference via Amwell    Distant Location (Provider):  Off-site    As the provider I attest to compliance with applicable laws and regulations related to telemedicine.    DATA  Interactive Complexity: No  Crisis: No        Progress Since Last Session (Related to Symptoms / Goals / Homework):   Symptoms: No change second session    Homework: Did not complete      Episode of Care Goals: No improvement - PRECONTEMPLATION (Not seeing need for change); Intervened by educating the patient about the effects of current behavior on health.  Evoked information about reasons to continue behavior, express concern / recommendations, and explored any change talk     Current / Ongoing Stressors and Concerns:  Recovering from relationship breakup, pattern of struggles with romantic and friend  relationships, lacking confidence with having a future with someone, inappropriate self disclosure. Disrupted sleep. Seeking skills to address ADHD task-workload management-prioritization-hyperfocus. Recognizing perfectionism gets in the way of progress, for example with writing and completing books.        Treatment Objective(s) Addressed in This Session:   Improve quantity and quality of night time sleep / decrease daytime naps   Identify needs in a relationship     Intervention:   Provided psychoeducation on sleep hygieine.  Provided active listening and validation.     Processed past relationship stressors and infidelity. Worked to identify relational dysfunction patterns and surfaced needs in relationship.     Assessments completed prior to visit:  The following assessments were completed by patient for this visit:  PHQ2:   PHQ-2 ( 1999 [employer]) 8/29/2022 8/26/2022 8/27/2021 11/25/2019 2/29/2016   Q1: Little interest or pleasure in doing things 0 0 0 0 0   Q2: Feeling down, depressed or hopeless 0 0 0 0 0   PHQ-2 Score 0 0 0 0 0   PHQ-2 Total Score (12-17 Years)- Positive if 3 or more points; Administer PHQ-A if positive - - 0 0 -   Q1: Little interest or pleasure in doing things - Not at all - - -   Q2: Feeling down, depressed or hopeless - Not at all - - -   PHQ-2 Score - 0 - - -     PHQ9:   PHQ-9 SCORE 11/25/2019 8/11/2020 8/27/2021 5/24/2022 8/29/2022 11/7/2022 1/10/2023   PHQ-9 Total Score MyChart - - - 6 (Mild depression) - 1 (Minimal depression) 1 (Minimal depression)   PHQ-9 Total Score 0 2 0 6 3 1 1     GAD2:   ASAEL-2 11/7/2022 1/10/2023   Feeling nervous, anxious, or on edge 1 0   Not being able to stop or control worrying 1 0   ASAEL-2 Total Score 2 0     GAD7:   ASAEL-7 SCORE 9/21/2017 10/29/2018 11/25/2019 8/11/2020 8/27/2021 8/29/2022 11/8/2022   Total Score 1 5 3 1 0 0 4     CAGE-AID:   CAGE-AID Total Score 11/7/2022   Total Score 3   Total Score MyChart 3 (A total score of 2 or greater is considered  clinically significant)     PROMIS 10-Global Health (all questions and answers displayed):   PROMIS 10 1/10/2023   In general, would you say your health is: Good   In general, would you say your quality of life is: Very good   In general, how would you rate your physical health? Good   In general, how would you rate your mental health, including your mood and your ability to think? Fair   In general, how would you rate your satisfaction with your social activities and relationships? Fair   In general, please rate how well you carry out your usual social activities and roles Fair   To what extent are you able to carry out your everyday physical activities such as walking, climbing stairs, carrying groceries, or moving a chair? Completely   How often have you been bothered by emotional problems such as feeling anxious, depressed or irritable? Sometimes   How would you rate your fatigue on average? Mild   How would you rate your pain on average?   0 = No Pain  to  10 = Worst Imaginable Pain 0   In general, would you say your health is: 3   In general, would you say your quality of life is: 4   In general, how would you rate your physical health? 3   In general, how would you rate your mental health, including your mood and your ability to think? 2   In general, how would you rate your satisfaction with your social activities and relationships? 2   In general, please rate how well you carry out your usual social activities and roles. (This includes activities at home, at work and in your community, and responsibilities as a parent, child, spouse, employee, friend, etc.) 2   To what extent are you able to carry out your everyday physical activities such as walking, climbing stairs, carrying groceries, or moving a chair? 5   In the past 7 days, how often have you been bothered by emotional problems such as feeling anxious, depressed, or irritable? 3   In the past 7 days, how would you rate your fatigue on average? 2   In  the past 7 days, how would you rate your pain on average, where 0 means no pain, and 10 means worst imaginable pain? 0   Global Mental Health Score 11   Global Physical Health Score 17   PROMIS TOTAL - SUBSCORES 28   Some recent data might be hidden     [place] Suicide Severity Rating Scale (Lifetime/Recent)  [place] Suicide Severity Rating (Lifetime/Recent) 11/8/2022   1. Wish to be Dead (Lifetime) 0   2. Non-Specific Active Suicidal Thoughts (Lifetime) 0   Actual Attempt (Lifetime) 0   Has subject engaged in non-suicidal self-injurious behavior? (Lifetime) 0   Interrupted Attempts (Lifetime) 0   Aborted or Self-Interrupted Attempt (Lifetime) 0   Preparatory Acts or Behavior (Lifetime) 0   Calculated C-SSRS Risk Score (Lifetime/Recent) No Risk Indicated         ASSESSMENT: Current Emotional / Mental Status (status of significant symptoms):   Risk status (Self / Other harm or suicidal ideation)   Patient denies current fears or concerns for personal safety.   Patient denies current or recent suicidal ideation or behaviors.   Patient denies current or recent homicidal ideation or behaviors.   Patient denies current or recent self injurious behavior or ideation.   Patient denies other safety concerns.   Patient reports there has been no change in risk factors since their last session.     Patient reports there has been no change in protective factors since their last session.     Recommended that patient call 911 or go to the local ED should there be a change in any of these risk factors.     Appearance:   Appropriate    Eye Contact:   Good    Psychomotor Behavior: Normal  Restless    Attitude:   Cooperative  Friendly   Orientation:   All   Speech    Rate / Production: Normal     Volume:  Normal    Mood:    Anxious  Normal   Affect:    Appropriate  Expansive    Thought Content:  Clear    Thought Form:  Coherent  Goal Directed  Logical    Insight:    Good      Medication Review:   No changes to current psychiatric  medication(s)     Medication Compliance:   No Not taking Adderall. Has concerns about side effects.      Changes in Health Issues:   None reported     Chemical Use Review:   Substance Use: Chemical use reviewed, no active concerns identified      Tobacco Use: No current tobacco use.      Diagnosis:  1. Attention deficit hyperactivity disorder, combined type        Collateral Reports Completed:   Not Applicable    PLAN: (Patient Tasks / Therapist Tasks / Other)    Try sleep hygiene, understand needs in relationships. Answer: how do I want to feel, how do I want to be treated and how do I want this relationship to function?      Bhargavi Max, [facility] 2/1/23                                                         ______________________________________________________________________

## 2023-02-13 ENCOUNTER — VIRTUAL VISIT (OUTPATIENT)
Dept: PSYCHOLOGY | Facility: CLINIC | Age: 38
End: 2023-02-13
Payer: COMMERCIAL

## 2023-02-13 DIAGNOSIS — F90.2 ATTENTION DEFICIT HYPERACTIVITY DISORDER, COMBINED TYPE: Primary | ICD-10-CM

## 2023-02-13 PROCEDURE — 90834 PSYTX W PT 45 MINUTES: CPT | Mod: VID

## 2023-02-13 ASSESSMENT — PATIENT HEALTH QUESTIONNAIRE - PHQ9
10. IF YOU CHECKED OFF ANY PROBLEMS, HOW DIFFICULT HAVE THESE PROBLEMS MADE IT FOR YOU TO DO YOUR WORK, TAKE CARE OF THINGS AT HOME, OR GET ALONG WITH OTHER PEOPLE: NOT DIFFICULT AT ALL
SUM OF ALL RESPONSES TO PHQ QUESTIONS 1-9: 0
SUM OF ALL RESPONSES TO PHQ QUESTIONS 1-9: 0

## 2023-02-13 NOTE — PROGRESS NOTES
[facility]                                     Progress Note    Patient Name: Julia Boss  Date: 2/13/23         Service Type: Individual      Session Start Time: 9:00 am  Session End Time: 9:45 am     Session Length: 45 min    Session #: 3    Attendees: Client attended alone    Service Modality:  Video Visit:      Provider verified identity through the following two step process.  Patient provided:  Patient address    Telemedicine Visit: The patient's condition can be safely assessed and treated via synchronous audio and visual telemedicine encounter.      Reason for Telemedicine Visit: Patient has requested telehealth visit    Originating Site (Patient Location): Patient's home    Distant Site (Provider Location): Provider Remote Setting- Home Office    Consent:  The patient/guardian has verbally consented to: the potential risks and benefits of telemedicine (video visit) versus in person care; bill my insurance or make self-payment for services provided; and responsibility for payment of non-covered services.     Patient would like the video invitation sent by:  My Chart    Mode of Communication:  Video Conference via Amwell    Distant Location (Provider):  Off-site    As the provider I attest to compliance with applicable laws and regulations related to telemedicine.    DATA  Interactive Complexity: No  Crisis: No     Progress Since Last Session (Related to Symptoms / Goals / Homework):   Symptoms: Improving :    Homework: Did not complete      Episode of Care Goals: No improvement - PRECONTEMPLATION (Not seeing need for change); Intervened by educating the patient about the effects of current behavior on health.  Evoked information about reasons to continue behavior, express concern / recommendations, and explored any change talk     Current / Ongoing Stressors and Concerns:  Recovering from relationship breakup, pattern of struggles with romantic and friend relationships,  lacking confidence with having a future with someone, inappropriate self disclosure. Disrupted sleep. Seeking skills to address ADHD task-workload management-prioritization-hyperfocus. Recognizing perfectionism gets in the way of progress, for example with writing and completing books.        Treatment Objective(s) Addressed in This Session:   Improve quantity and quality of night time sleep / decrease daytime naps   Identify needs in a relationship     Intervention:   Provided active listening and validation. Celebrated growing comfort in  business    Reflected on relationship patterns and 'lady in waiting' role. Revisited wants and needs.   Assessments completed prior to visit:  The following assessments were completed by patient for this visit:  PHQ2:   PHQ-2 ( 1999 Pfizer) 8/29/2022 8/26/2022 8/27/2021 11/25/2019 2/29/2016   Q1: Little interest or pleasure in doing things 0 0 0 0 0   Q2: Feeling down, depressed or hopeless 0 0 0 0 0   PHQ-2 Score 0 0 0 0 0   PHQ-2 Total Score (12-17 Years)- Positive if 3 or more points; Administer PHQ-A if positive - - 0 0 -   Q1: Little interest or pleasure in doing things - Not at all - - -   Q2: Feeling down, depressed or hopeless - Not at all - - -   PHQ-2 Score - 0 - - -     PHQ9:   PHQ-9 SCORE 8/11/2020 8/27/2021 5/24/2022 8/29/2022 11/7/2022 1/10/2023 2/13/2023   PHQ-9 Total Score MyChart - - 6 (Mild depression) - 1 (Minimal depression) 1 (Minimal depression) 0   PHQ-9 Total Score 2 0 6 3 1 1 0     GAD2:   ASAEL-2 11/7/2022 1/10/2023 2/13/2023   Feeling nervous, anxious, or on edge 1 0 0   Not being able to stop or control worrying 1 0 0   ASAEL-2 Total Score 2 0 0     GAD7:   ASAEL-7 SCORE 9/21/2017 10/29/2018 11/25/2019 8/11/2020 8/27/2021 8/29/2022 11/8/2022   Total Score 1 5 3 1 0 0 4     CAGE-AID:   CAGE-AID Total Score 11/7/2022   Total Score 3   Total Score MyChart 3 (A total score of 2 or greater is considered clinically significant)     PROMIS 10-Global Health (all  questions and answers displayed):   PROMIS 10 1/10/2023   In general, would you say your health is: Good   In general, would you say your quality of life is: Very good   In general, how would you rate your physical health? Good   In general, how would you rate your mental health, including your mood and your ability to think? Fair   In general, how would you rate your satisfaction with your social activities and relationships? Fair   In general, please rate how well you carry out your usual social activities and roles Fair   To what extent are you able to carry out your everyday physical activities such as walking, climbing stairs, carrying groceries, or moving a chair? Completely   How often have you been bothered by emotional problems such as feeling anxious, depressed or irritable? Sometimes   How would you rate your fatigue on average? Mild   How would you rate your pain on average?   0 = No Pain  to  10 = Worst Imaginable Pain 0   In general, would you say your health is: 3   In general, would you say your quality of life is: 4   In general, how would you rate your physical health? 3   In general, how would you rate your mental health, including your mood and your ability to think? 2   In general, how would you rate your satisfaction with your social activities and relationships? 2   In general, please rate how well you carry out your usual social activities and roles. (This includes activities at home, at work and in your community, and responsibilities as a parent, child, spouse, employee, friend, etc.) 2   To what extent are you able to carry out your everyday physical activities such as walking, climbing stairs, carrying groceries, or moving a chair? 5   In the past 7 days, how often have you been bothered by emotional problems such as feeling anxious, depressed, or irritable? 3   In the past 7 days, how would you rate your fatigue on average? 2   In the past 7 days, how would you rate your pain on average,  where 0 means no pain, and 10 means worst imaginable pain? 0   Global Mental Health Score 11   Global Physical Health Score 17   PROMIS TOTAL - SUBSCORES 28   Some recent data might be hidden     [place] Suicide Severity Rating Scale (Lifetime/Recent)  [place] Suicide Severity Rating (Lifetime/Recent) 11/8/2022   1. Wish to be Dead (Lifetime) 0   2. Non-Specific Active Suicidal Thoughts (Lifetime) 0   Actual Attempt (Lifetime) 0   Has subject engaged in non-suicidal self-injurious behavior? (Lifetime) 0   Interrupted Attempts (Lifetime) 0   Aborted or Self-Interrupted Attempt (Lifetime) 0   Preparatory Acts or Behavior (Lifetime) 0   Calculated C-SSRS Risk Score (Lifetime/Recent) No Risk Indicated         ASSESSMENT: Current Emotional / Mental Status (status of significant symptoms):   Risk status (Self / Other harm or suicidal ideation)   Patient denies current fears or concerns for personal safety.   Patient denies current or recent suicidal ideation or behaviors.   Patient denies current or recent homicidal ideation or behaviors.   Patient denies current or recent self injurious behavior or ideation.   Patient denies other safety concerns.   Patient reports there has been no change in risk factors since their last session.     Patient reports there has been no change in protective factors since their last session.     Recommended that patient call 911 or go to the local ED should there be a change in any of these risk factors.     Appearance:   Appropriate    Eye Contact:   Good    Psychomotor Behavior: Normal  Restless    Attitude:   Cooperative  Friendly   Orientation:   All   Speech    Rate / Production: Normal     Volume:  Normal    Mood:    Anxious    Affect:    Appropriate  Expansive    Thought Content:  Clear    Thought Form:  Coherent  Goal Directed  Logical    Insight:    Good       Medication Review:   No changes to current psychiatric medication(s)     Medication Compliance:   No Not taking Adderall.  Has concerns about side effects.      Changes in Health Issues:   None reported     Chemical Use Review:   Substance Use: Chemical use reviewed, no active concerns identified      Tobacco Use: No current tobacco use.      Diagnosis:  1. Attention deficit hyperactivity disorder, combined type        Collateral Reports Completed:   Not Applicable    PLAN: (Patient Tasks / Therapist Tasks / Other)    Continue: sleep hygiene, understand needs in relationships. Answer: how do I want to feel, how do I want to be treated and how do I want this relationship to function?      Bhargavi Max, [facility] 2/13/23                                                         ______________________________________________________________________

## 2023-02-27 DIAGNOSIS — F90.9 ATTENTION DEFICIT HYPERACTIVITY DISORDER (ADHD), UNSPECIFIED ADHD TYPE: ICD-10-CM

## 2023-02-27 RX ORDER — ATOMOXETINE 40 MG/1
CAPSULE ORAL
Qty: 60 CAPSULE | Refills: 0 | OUTPATIENT
Start: 2023-02-27

## 2023-02-28 NOTE — TELEPHONE ENCOUNTER
Patient Contact     Attempt # 1     Was call answered?    No  Left non-detailed message to call the clinic back at [phone]. [employer] message sent as well.     On call back:      -Relay message from Dr. Mosqueda, see below.     Anabela GAVIN RN  [facility]

## 2023-03-02 NOTE — TELEPHONE ENCOUNTER
Call attempt #2.     Unable to leave message, phone has busy signal.     Zoey Ocampo RN  [facility]

## 2023-03-03 NOTE — TELEPHONE ENCOUNTER
Pt reviewed Dr. Mosqueda's message via [employer].    From   Anabela Adams RN To   Julia Boss Sent and Delivered   2/28/2023  4:52 PM   Last Read in [employer]   3/2/2023  8:50 PM by Julia GAVIN RN  [facility]

## 2023-07-31 ENCOUNTER — PATIENT OUTREACH (OUTPATIENT)
Dept: CARE COORDINATION | Facility: CLINIC | Age: 38
End: 2023-07-31
Payer: COMMERCIAL

## 2023-08-14 ENCOUNTER — PATIENT OUTREACH (OUTPATIENT)
Dept: CARE COORDINATION | Facility: CLINIC | Age: 38
End: 2023-08-14
Payer: COMMERCIAL

## 2023-11-04 ENCOUNTER — HEALTH MAINTENANCE LETTER (OUTPATIENT)
Age: 38
End: 2023-11-04

## 2023-11-06 DIAGNOSIS — L70.9 ACNE, UNSPECIFIED ACNE TYPE: ICD-10-CM

## 2023-11-06 RX ORDER — DROSPIRENONE AND ETHINYL ESTRADIOL 0.02-3(28)
KIT ORAL
Qty: 112 TABLET | Refills: 0 | Status: SHIPPED | OUTPATIENT
Start: 2023-11-06 | End: 2023-11-13

## 2023-11-06 RX ORDER — DROSPIRENONE AND ETHINYL ESTRADIOL 0.02-3(28)
KIT ORAL
Qty: 28 TABLET | Refills: 0 | Status: SHIPPED | OUTPATIENT
Start: 2023-11-06 | End: 2023-11-06

## 2023-11-06 NOTE — TELEPHONE ENCOUNTER
"Requested Prescriptions   Pending Prescriptions Disp Refills    drospirenone-ethinyl estradiol (DESHAWN) 3-0.02 MG tablet [Pharmacy Med Name: DROSPIRENONE/ETHY EST 3/0.02MG T 28] 112 tablet      Sig: TAKE 1 TABLET BY MOUTH DAILY ACTIVE TABLETS ONLY       Contraceptives Protocol Failed - 11/6/2023  9:06 AM        Failed - Recent (12 mo) or future (30 days) visit within the authorizing provider's specialty     Patient has had an office visit with the authorizing provider or a provider within the authorizing providers department within the previous 12 mos or has a future within next 30 days. See \"Patient Info\" tab in inbasket, or \"Choose Columns\" in Meds & Orders section of the refill encounter.              Passed - Patient is not a current smoker if age is 35 or older        Passed - Medication is active on med list        Passed - No active pregnancy on record        Passed - No positive pregnancy test in past 12 months           Appt scheduled. 90 day supply approved per protocol  [place] RN on 11/6/2023 at 10:51 AM    "

## 2023-11-06 NOTE — TELEPHONE ENCOUNTER
"Requested Prescriptions   Pending Prescriptions Disp Refills    drospirenone-ethinyl estradiol (DESHAWN) 3-0.02 MG tablet [Pharmacy Med Name: DROSPIRENONE/ETHY EST 3/0.02MG T 28] 112 tablet 4     Sig: TAKE 1 TABLET BY MOUTH EVERY DAY, ACTIVE TABLETS ONLY, SKIP PLACEBO PILLS. TAKE CONTINUOUSLY       Contraceptives Protocol Failed - 11/6/2023  3:12 AM        Failed - Recent (12 mo) or future (30 days) visit within the authorizing provider's specialty     Patient has had an office visit with the authorizing provider or a provider within the authorizing providers department within the previous 12 mos or has a future within next 30 days. See \"Patient Info\" tab in inbasket, or \"Choose Columns\" in Meds & Orders section of the refill encounter.              Passed - Patient is not a current smoker if age is 35 or older        Passed - Medication is active on med list        Passed - No active pregnancy on record        Passed - No positive pregnancy test in past 12 months           Last Written Prescription Date:  8/29/22-date of annual  Last Fill Quantity: 112,  # refills: 4   Last office visit: 11/3/2022 ; last virtual visit: Visit date not found with prescribing provider:  Dr Ricci   Future Office Visit:  11/13/2023 w Keiry      Prescription approved per [facility] Refill Protocol.  Abena Acuña RN on 11/6/2023 at 7:52 AM      "

## 2023-11-13 ENCOUNTER — OFFICE VISIT (OUTPATIENT)
Dept: OBGYN | Facility: CLINIC | Age: 38
End: 2023-11-13
Payer: COMMERCIAL

## 2023-11-13 VITALS
HEIGHT: 66 IN | BODY MASS INDEX: 26.03 KG/M2 | WEIGHT: 162 LBS | SYSTOLIC BLOOD PRESSURE: 112 MMHG | DIASTOLIC BLOOD PRESSURE: 66 MMHG | HEART RATE: 68 BPM

## 2023-11-13 DIAGNOSIS — L70.9 ACNE, UNSPECIFIED ACNE TYPE: ICD-10-CM

## 2023-11-13 DIAGNOSIS — Z01.419 ENCOUNTER FOR GYNECOLOGICAL EXAMINATION WITHOUT ABNORMAL FINDING: Primary | ICD-10-CM

## 2023-11-13 DIAGNOSIS — Z11.51 SCREENING FOR HUMAN PAPILLOMAVIRUS (HPV): ICD-10-CM

## 2023-11-13 DIAGNOSIS — Z23 NEED FOR PROPHYLACTIC VACCINATION AND INOCULATION AGAINST INFLUENZA: ICD-10-CM

## 2023-11-13 PROCEDURE — G0145 SCR C/V CYTO,THINLAYER,RESCR: HCPCS | Performed by: OBSTETRICS & GYNECOLOGY

## 2023-11-13 PROCEDURE — 87591 N.GONORRHOEAE DNA AMP PROB: CPT | Performed by: OBSTETRICS & GYNECOLOGY

## 2023-11-13 PROCEDURE — 90686 IIV4 VACC NO PRSV 0.5 ML IM: CPT | Performed by: OBSTETRICS & GYNECOLOGY

## 2023-11-13 PROCEDURE — 90471 IMMUNIZATION ADMIN: CPT | Performed by: OBSTETRICS & GYNECOLOGY

## 2023-11-13 PROCEDURE — 87624 HPV HI-RISK TYP POOLED RSLT: CPT | Performed by: OBSTETRICS & GYNECOLOGY

## 2023-11-13 PROCEDURE — 99213 OFFICE O/P EST LOW 20 MIN: CPT | Mod: 25 | Performed by: OBSTETRICS & GYNECOLOGY

## 2023-11-13 PROCEDURE — 99395 PREV VISIT EST AGE 18-39: CPT | Mod: 25 | Performed by: OBSTETRICS & GYNECOLOGY

## 2023-11-13 PROCEDURE — 87491 CHLMYD TRACH DNA AMP PROBE: CPT | Performed by: OBSTETRICS & GYNECOLOGY

## 2023-11-13 RX ORDER — DROSPIRENONE AND ETHINYL ESTRADIOL 0.02-3(28)
1 KIT ORAL DAILY
Qty: 112 TABLET | Refills: 4 | Status: SHIPPED | OUTPATIENT
Start: 2023-11-13

## 2023-11-13 ASSESSMENT — PATIENT HEALTH QUESTIONNAIRE - PHQ9
SUM OF ALL RESPONSES TO PHQ QUESTIONS 1-9: 1
5. POOR APPETITE OR OVEREATING: NOT AT ALL

## 2023-11-13 ASSESSMENT — ANXIETY QUESTIONNAIRES
6. BECOMING EASILY ANNOYED OR IRRITABLE: NOT AT ALL
1. FEELING NERVOUS, ANXIOUS, OR ON EDGE: NOT AT ALL
IF YOU CHECKED OFF ANY PROBLEMS ON THIS QUESTIONNAIRE, HOW DIFFICULT HAVE THESE PROBLEMS MADE IT FOR YOU TO DO YOUR WORK, TAKE CARE OF THINGS AT HOME, OR GET ALONG WITH OTHER PEOPLE: NOT DIFFICULT AT ALL
GAD7 TOTAL SCORE: 0
5. BEING SO RESTLESS THAT IT IS HARD TO SIT STILL: NOT AT ALL
GAD7 TOTAL SCORE: 0
3. WORRYING TOO MUCH ABOUT DIFFERENT THINGS: NOT AT ALL
2. NOT BEING ABLE TO STOP OR CONTROL WORRYING: NOT AT ALL
7. FEELING AFRAID AS IF SOMETHING AWFUL MIGHT HAPPEN: NOT AT ALL

## 2023-11-13 NOTE — PROGRESS NOTES
Julia is a 38 year old  female who presents for annual exam.     Besides routine health maintenance, she has no other health concerns today .    HPI:  The patient's PCP is  Tasha Mosqueda MD.      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     Accepts Influenza vacc      GYNECOLOGIC HISTORY:    No LMP recorded. (Menstrual status: Birth Control).    Regular menses? Birth Control ,NA      Her current contraception method is: oral contraceptives.  She  reports that she has never smoked. She has never used smokeless tobacco.    Patient is sexually active.  STD testing offered?  Accepted  Last PHQ-9 score on record =       2023     9:31 AM   PHQ-9 SCORE   PHQ-9 Total Score 1     Last GAD7 score on record =       2023     9:31 AM   ASAEL-7 SCORE   Total Score 0     Alcohol Score = 2    HEALTH MAINTENANCE:  Care Gaps  Close care gaps     Overdue          Never done ADVANCE CARE PLANNING (Every 5 Years)       Never done DEPRESSION ACTION PLAN (Once)       DEC 19   2017 DTAP/TDAP/TD IMMUNIZATION (9 - Td or Tdap)  Last completed: Dec 19, 2007     AUG 13   2023 PHQ-9 (Every 6 Months)  Last completed: 2023     SEP 1   2023 INFLUENZA VACCINE (1)  Last completed: Nov 3, 2022     SEP 1   2023 COVID-19 Vaccine ( season)  Last completed: 2022     NOV 3   2023 PAP FOLLOW-UP (Once)   Order placed this encounter     NOV 3   2023 HPV FOLLOW-UP (Once)   Order placed this encounter        Upcoming          2024 YEARLY PREVENTIVE VISIT (Yearly)  Last completed: 2023     Health maintenance updated:  yes    HISTORY:  OB History    Para Term  AB Living   0 0 0 0 0 0   SAB IAB Ectopic Multiple Live Births   0 0 0 0 0       Patient Active Problem List   Diagnosis    Overweight    Acne, unspecified acne type    Herpes simplex vulvovaginitis    Anxiety    Moderate episode of recurrent major depressive disorder (H)    S/P LEEP of  "cervix with PAULY 2-3     Past Surgical History:   Procedure Laterality Date    NO HISTORY OF SURGERY        Social History     Tobacco Use    Smoking status: Never    Smokeless tobacco: Never   Substance Use Topics    Alcohol use: Yes     Alcohol/week: 0.0 standard drinks of alcohol     Comment: socially      Problem (# of Occurrences) Relation (Name,Age of Onset)    Anxiety Disorder (1) Mother    Substance Abuse (2) Mother, Brother    Thyroid Disease (1) Mother    No Known Problems (7) Father, Maternal Grandmother, Maternal Grandfather, Paternal Grandmother, Paternal Grandfather, Sister, Other              Current Outpatient Medications   Medication Sig    atomoxetine (STRATTERA) 40 MG capsule Take 1 capsule (40 mg) by mouth daily for 3 days.  On day four increase to 80 mg PO daily    clotrimazole (LOTRIMIN) 1 % vaginal cream Place 1 Applicatorful vaginally At Bedtime For 7 days    drospirenone-ethinyl estradiol (DESHAWN) 3-0.02 MG tablet Take 1 tablet by mouth daily    Emollient (CERAVE MOISTURIZING) CREA Externally apply 1 each topically 2 times daily     No current facility-administered medications for this visit.     No Known Allergies    Past medical, surgical, social and family histories were reviewed and updated in EPIC.    ROS:   12 point review of systems negative other than symptoms noted below or in the HPI.  No urinary frequency or dysuria, bladder or kidney problems    EXAM:  /66   Pulse 68   Ht 1.676 m (5' 6\")   Wt 73.5 kg (162 lb)   BMI 26.15 kg/m     BMI: Body mass index is 26.15 kg/m .    PHYSICAL EXAM:  Constitutional:   Appearance: Well nourished, well developed, alert, in no acute distress  Neck:  Lymph Nodes:  No lymphadenopathy present    Thyroid:  Gland size normal, nontender, no nodules or masses present  on palpation  Chest:  Respiratory Effort:  Breathing unlabored  Cardiovascular:    Heart: Auscultation:  Regular rate, normal rhythm, no murmurs present  Breasts: Inspection of " Breasts:  No lymphadenopathy present., Palpation of Breasts and Axillae:  No masses present on palpation, no breast tenderness., Axillary Lymph Nodes:  No lymphadenopathy present., and No nodularity, asymmetry or nipple discharge bilaterally.  Gastrointestinal:   Abdominal Examination:  Abdomen nontender to palpation, tone normal without rigidity or guarding, no masses present, umbilicus without lesions   Liver and Spleen:  No hepatomegaly present, liver nontender to palpation    Hernias:  No hernias present  Lymphatic: Lymph Nodes:  No other lymphadenopathy present  Skin:  General Inspection:  No rashes present, no lesions present, no areas of  discoloration  Neurologic:    Mental Status:  Oriented X3.  Normal strength and tone, sensory exam                grossly normal, mentation intact and speech normal.    Psychiatric:   Mentation appears normal and affect normal/bright.         Pelvic Exam:  External Genitalia:     Normal appearance for age, no discharge present, no tenderness present, no inflammatory lesions present, color normal  Vagina:    Normal vaginal vault without central or paravaginal defects, no discharge present, no inflammatory lesions present, no masses present  Bladder:     Nontender to palpation  Urethra:   Urethral Body:  Urethra palpation normal, urethra structural support normal   Urethral Meatus:  No erythema or lesions present  Cervix:     Appearance healthy, no lesions present, nontender to palpation, no bleeding present, string present  Uterus:     Nontender to palpation, no masses present, position anteflexed, mobility: normal  Adnexa:     No adnexal tenderness present, no adnexal masses present  Perineum:     Perineum within normal limits, no evidence of trauma, no rashes or skin lesions present  Anus:     Anus within normal limits, no hemorrhoids present  Inguinal Lymph Nodes:     No lymphadenopathy present  Pubic Hair:     Normal pubic hair distribution for age  Genitalia and Groin:      No rashes present, no lesions present, no areas of discoloration, no masses present    COUNSELING:   Reviewed preventive health counseling, as reflected in patient instructions       Osteoporosis prevention/bone health    BMI: Body mass index is 26.15 kg/m .      ASSESSMENT:  38 year old female with satisfactory annual exam.    ICD-10-CM    1. Encounter for gynecological examination without abnormal finding  Z01.419 Pap thin layer screen with HPV - recommended age 30 - 65 years     NEISSERIA GONORRHOEA PCR     CHLAMYDIA TRACHOMATIS PCR      2. Acne, unspecified acne type  L70.9 drospirenone-ethinyl estradiol (DESHAWN) 3-0.02 MG tablet      3. Need for prophylactic vaccination and inoculation against influenza  Z23           PLAN:  -Pap/hpv obtained for cervical cancer screening. Manage per guidelines, including q 3yr pap testing for those <30 and q 5yr pap/hpv for those >30 when appropriate.   -Breast self awareness discussed. Age 40 for mammogram.  -Colonoscopy age 45  -Osteoporosis prevention discussed.  -Accepts STI labs  -Refill OCP for the year, doing well  -Return one year for next annual exam        Courtney Christie Masters, DO

## 2023-11-14 LAB
C TRACH DNA SPEC QL NAA+PROBE: NEGATIVE
N GONORRHOEA DNA SPEC QL NAA+PROBE: NEGATIVE

## 2023-11-14 NOTE — PATIENT INSTRUCTIONS
-Daily total calcium intake (between food/supplements) should be 1000mg which equates to 3-4 servings calcium containing food per day; VItamin D 2000IU.   Foods rich in calcium are: milk, cheese, yogurt, seafood, sardines and canned salmon, leafy green vegetables such as karo greens, spinach and kale, beans and lentils, almonds, seeds (poppy, sesame, celery, yvonne), rhubarb, dried fruit such as figs, whey protein, tofu and edamame, amaranth, other foods with added calcium such as orange juice and some cereals.   If adequate amount not taken in diet, then a supplement may be needed.     -I also recommend increasing your dietary fiber by starting Metamucil (powder mixed in glass of water) once to twice daily

## 2023-11-16 LAB
BKR LAB AP GYN ADEQUACY: NORMAL
BKR LAB AP GYN INTERPRETATION: NORMAL
BKR LAB AP HPV REFLEX: NORMAL
BKR LAB AP PREVIOUS ABNL DX: NORMAL
BKR LAB AP PREVIOUS ABNORMAL: NORMAL
PATH REPORT.COMMENTS IMP SPEC: NORMAL
PATH REPORT.COMMENTS IMP SPEC: NORMAL
PATH REPORT.RELEVANT HX SPEC: NORMAL

## 2023-11-21 LAB
HUMAN PAPILLOMA VIRUS 16 DNA: NEGATIVE
HUMAN PAPILLOMA VIRUS 18 DNA: NEGATIVE
HUMAN PAPILLOMA VIRUS FINAL DIAGNOSIS: NORMAL
HUMAN PAPILLOMA VIRUS OTHER HR: NEGATIVE

## 2023-11-22 ENCOUNTER — PATIENT OUTREACH (OUTPATIENT)
Dept: OBGYN | Facility: CLINIC | Age: 38
End: 2023-11-22
Payer: COMMERCIAL

## 2023-11-25 DIAGNOSIS — L70.9 ACNE, UNSPECIFIED ACNE TYPE: ICD-10-CM

## 2023-11-27 RX ORDER — DROSPIRENONE AND ETHINYL ESTRADIOL 0.02-3(28)
KIT ORAL
Qty: 112 TABLET | Refills: 4 | OUTPATIENT
Start: 2023-11-27

## 2023-11-27 NOTE — TELEPHONE ENCOUNTER
"Requested Prescriptions   Pending Prescriptions Disp Refills    drospirenone-ethinyl estradiol (DESHAWN) 3-0.02 MG tablet [Pharmacy Med Name: DROSPIRENONE/ETHY EST 3/0.02MG T 28] 112 tablet 4     Sig: TAKE 1 TABLET BY MOUTH DAILY ACTIVE TABLETS ONLY       Contraceptives Protocol Passed - 11/25/2023  3:10 AM        Passed - Patient is not a current smoker if age is 35 or older        Passed - Recent (12 mo) or future (30 days) visit within the authorizing provider's specialty     Patient has had an office visit with the authorizing provider or a provider within the authorizing providers department within the previous 12 mos or has a future within next 30 days. See \"Patient Info\" tab in inbasket, or \"Choose Columns\" in Meds & Orders section of the refill encounter.              Passed - Medication is active on med list        Passed - No active pregnancy on record        Passed - No positive pregnancy test in past 12 months           Last Written Prescription Date:  11/13/23  Last Fill Quantity: 112,  # refills: 4   Last office visit: 11/13/2023 ; last virtual visit: Visit date not found with prescribing provider:  Keiry   Future Office Visit:  NONE    Refill request refused due to :   [x] Refills available at pharmacy.  Request sent back to pharmacy as \"duplicate\"    Jaycee Brown RN on 11/27/2023 at 6:20 AM      "

## 2023-11-27 NOTE — TELEPHONE ENCOUNTER
Refill already sent to pharmacy for drospirenone/ethy est 3/0.02mg.    Bettye Montague MA on 11/27/2023 at 9:00 AM

## 2024-02-13 ENCOUNTER — OFFICE VISIT (OUTPATIENT)
Dept: FAMILY MEDICINE | Facility: CLINIC | Age: 39
End: 2024-02-13
Payer: COMMERCIAL

## 2024-02-13 VITALS
TEMPERATURE: 97.5 F | OXYGEN SATURATION: 98 % | DIASTOLIC BLOOD PRESSURE: 60 MMHG | WEIGHT: 154 LBS | HEART RATE: 76 BPM | RESPIRATION RATE: 18 BRPM | HEIGHT: 65 IN | SYSTOLIC BLOOD PRESSURE: 94 MMHG | BODY MASS INDEX: 25.66 KG/M2

## 2024-02-13 DIAGNOSIS — F33.1 MODERATE EPISODE OF RECURRENT MAJOR DEPRESSIVE DISORDER (H): ICD-10-CM

## 2024-02-13 DIAGNOSIS — F90.9 ATTENTION DEFICIT HYPERACTIVITY DISORDER (ADHD), UNSPECIFIED ADHD TYPE: Primary | ICD-10-CM

## 2024-02-13 DIAGNOSIS — F41.9 ANXIETY: ICD-10-CM

## 2024-02-13 PROCEDURE — 99213 OFFICE O/P EST LOW 20 MIN: CPT | Performed by: INTERNAL MEDICINE

## 2024-02-13 SDOH — HEALTH STABILITY: PHYSICAL HEALTH: ON AVERAGE, HOW MANY DAYS PER WEEK DO YOU ENGAGE IN MODERATE TO STRENUOUS EXERCISE (LIKE A BRISK WALK)?: 4 DAYS

## 2024-02-13 ASSESSMENT — SOCIAL DETERMINANTS OF HEALTH (SDOH): HOW OFTEN DO YOU GET TOGETHER WITH FRIENDS OR RELATIVES?: MORE THAN THREE TIMES A WEEK

## 2024-02-13 ASSESSMENT — PAIN SCALES - GENERAL: PAINLEVEL: NO PAIN (0)

## 2024-02-13 NOTE — PROGRESS NOTES
Assessment and Plan  1. Attention deficit hyperactivity disorder (ADHD), unspecified ADHD type  2. Moderate episode of recurrent major depressive disorder (H)  3. Anxiety  Chronic conditions, patient has been on Adderall in the past by previous providers before I have last seen her in January 2023 for the first time, and shared decision at that time was to avoid stimulants and go with nonstimulants.  At which time we have started on Strattera, offered Wellbutrin but patient states that she has tried and failed Wellbutrin in the past with unspecified side effects.  Currently she states that Strattera is also not tolerated by her due to nausea side effect.    - Patient was following psychologist and psychotherapy counseling in the interim, she is here today for considering possible need of further treatment of ADHD as she is not taking any medications at this time.  - Given the complexity of medical conditions and the discussions we had today I would recommend MD psychiatry to be on board for further recommendations on possible overlap of other psychological conditions and consider treatment plan accordingly instead of trying various medications at this time.  Patient understood the plan.  - Adult Mental Health  Referral; Future         Please note that this note consists of symbols derived from keyboarding, dictation and/or voice recognition software. As a result, there may be errors in the script that have gone undetected. Please consider this when interpreting information found in this chart.    Patient Instructions   As discussed , recommend to get a MD psychiatry on board for further recommendations.       Return in about 3 months (around 5/13/2024), or if symptoms worsen or fail to improve, for Follow up of last visit, If symptoms persist.    Tasha Mosqueda MD  [facility]      Cullen Joyaen is a 38 year old, presenting for the following health issues:  Recheck  Medication        2/13/2024     4:03 PM   Additional Questions   Roomed by Terra HARRY     History of Present Illness       Reason for visit:  Annual and ADHD    She eats 2-3 servings of fruits and vegetables daily.She consumes 2 sweetened beverage(s) daily.She exercises with enough effort to increase her heart rate 30 to 60 minutes per day.  She exercises with enough effort to increase her heart rate 4 days per week.   She is taking medications regularly.       Last seen patient in January 2023 for establish care and rehistory at the time, and started on Strattera at that time all the lab work done at that time was showing benign except wet prep with mild WBC.     Pt may have possible overlap psychological conditions which will need tretament with appropriate medications alternatively for current psychological situation.       No Known Allergies     Past Medical History:   Diagnosis Date    Acne, unspecified acne type 08/31/2016    Anxiety 08/31/2016    Cervical high risk HPV (human papillomavirus) test positive     2016, 2017, 2018, 2019, 2020, 2021, 2022    Encounter for IUD insertion 10/12/2017    Kyleena inserted by Dr Ricci    Herpes simplex vulvovaginitis 02/2016    PCR confirmed vulvovaginal HSV-1    History of vaccination against human papillomavirus     completed    Moderate episode of recurrent major depressive disorder (H) 08/31/2016    Overweight 08/25/2010       Past Surgical History:   Procedure Laterality Date    NO HISTORY OF SURGERY         Family History   Problem Relation Age of Onset    Anxiety Disorder Mother     Substance Abuse Mother     Thyroid Disease Mother     No Known Problems Father     No Known Problems Maternal Grandmother     No Known Problems Maternal Grandfather     No Known Problems Paternal Grandmother     No Known Problems Paternal Grandfather     Substance Abuse Brother     No Known Problems Sister     No Known Problems Other        Social History     Tobacco Use    Smoking  "status: Never    Smokeless tobacco: Never   Substance Use Topics    Alcohol use: Yes     Alcohol/week: 0.0 standard drinks of alcohol     Comment: socially        Current Outpatient Medications   Medication    drospirenone-ethinyl estradiol (DESHAWN) 3-0.02 MG tablet    atomoxetine (STRATTERA) 40 MG capsule    clotrimazole (LOTRIMIN) 1 % vaginal cream    Emollient (CERAVE MOISTURIZING) CREA     No current facility-administered medications for this visit.        Review of Systems  Constitutional, HEENT, cardiovascular, pulmonary, GI, , musculoskeletal, neuro, skin, endocrine and psych systems are negative, except as otherwise noted.      Objective    BP 94/60 (BP Location: Right arm, Patient Position: Sitting, Cuff Size: Adult Regular)   Pulse 76   Temp 97.5  F (36.4  C) (Tympanic)   Resp 18   Ht 1.655 m (5' 5.16\")   Wt 69.9 kg (154 lb)   LMP  (Approximate)   SpO2 98%   BMI 25.50 kg/m    Body mass index is 25.5 kg/m .  Physical Exam   GENERAL: alert and no distress  NECK: no adenopathy, no asymmetry, masses, or scars  RESP: lungs clear to auscultation - no rales, rhonchi or wheezes  CV: regular rate and rhythm, normal S1 S2, no S3 or S4, no murmur, click or rub, no peripheral edema  ABDOMEN: soft, nontender, no hepatosplenomegaly, no masses and bowel sounds normal  MS: no gross musculoskeletal defects noted, no edema    Signed Electronically by: Tasha Mosqueda MD  =  "

## 2024-02-21 ENCOUNTER — TELEPHONE (OUTPATIENT)
Dept: PSYCHIATRY | Facility: CLINIC | Age: 39
End: 2024-02-21
Payer: COMMERCIAL

## 2024-02-21 NOTE — TELEPHONE ENCOUNTER
"PSYCHIATRY CLINIC PHONE INTAKE     SERVICES REQUESTED / INTERESTED IN          Med Management    Presenting Problem and Brief History                              What would you like to be seen for? (brief description):  Julia called to ask about ADHD providers and was explained that she was very upset about the treatment that she had received from the last 2 [place] providers that she had met with regarding her ADHD.    Julia explained that she is interested in trying medication to help with her focus. 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    She is very frustrated with how the other providers made her feel  like she was \"acting like a druggie\" when she told them that she wanted to try adderall. She hasn't not tried many medications and wants to be able to have the opportunity to test and try medications without judgement.    She mentioned that in the past, she had anxiety but that currently she is in a very good space and doesn't have trouble with anxiety. Her main focus is her neuro divergence.     Have you received a mental health diagnosis? Yes   Which one (s): Anxiety in the past, just ADHD currently  Is there any history of developmental delay?  No   Are you currently seeing a mental health provider?   She was, but not anymore             Who / month last seen:    Do you have mental health records elsewhere?  Yes  Will you sign a release so we can obtain them?   No    Have you ever been hospitalized for psychiatric reasons?  No  Describe:  None    Do you have current thoughts of self-harm?  No    Do you currently have thoughts of harming others?  No    Do you have any safety concerns? No   If yes to these, offer to reach out to a  for follow up.      Substance Use History     Do you have any history of alcohol / illicit drug use?  No  Describe:  none  Have you " ever received treatment for this?  No    Describe:  none     Social History     Who is the patient's a guardian?  Yes    Name / number: Self  Have you had an ACT team in last 12 months?  No  Describe: None   OK to leave a detailed voicemail?  Yes    Would you be interested in learning more about research opportunities for which you or your child may qualify? We can connect you with a team member for more information.  No  If yes, send an [employer] message to Milagros Donnelly    Medical/ Surgical History                                   Patient Active Problem List   Diagnosis    Overweight    Acne, unspecified acne type    Herpes simplex vulvovaginitis    Anxiety    Moderate episode of recurrent major depressive disorder (H)    S/P LEEP of cervix with PAULY 2-3          Medications             Have you taken >3 psychiatric medications in your past?   none  Do you currently take 5 or more medications, including prescriptions, supplements, and other over the counter products?  none    If YES to at least one of these questions:   As part of your evaluation in our clinic, we have specially trained pharmacists as part of your care team. Your provider would like for you to meet with one of our pharmacists to review your current and past medications, ensure your med list is up to date, and queue up any questions or concerns you have about medications. They will review all of your medications, not just for mental health, to help ensure you know what you re taking and that everything is working together.     Please schedule patient in [facility] PSYCHIATRY (Bettye Mendoza or Leilani Liao) for 60m MTM in any green space as virtual (video), telephone, or in person (designated in person days per Epic templates).  -Appt notes can say  Psych eval on xx/xx   -Route telephone encounter to the pharmacist who will be seeing the patient.  If patient has questions about insurance coverage or billing, please still schedule the visit and  refer them to call the MTM coordinators at [phone].    Current Outpatient Medications   Medication Sig Dispense Refill    atomoxetine (STRATTERA) 40 MG capsule Take 1 capsule (40 mg) by mouth daily for 3 days.  On day four increase to 80 mg PO daily (Patient not taking: Reported on 2/13/2024) 60 capsule 0    clotrimazole (LOTRIMIN) 1 % vaginal cream Place 1 Applicatorful vaginally At Bedtime For 7 days (Patient not taking: Reported on 2/13/2024) 45 g 0    drospirenone-ethinyl estradiol (DESHAWN) 3-0.02 MG tablet Take 1 tablet by mouth daily 112 tablet 4    Emollient (CERAVE MOISTURIZING) CREA Externally apply 1 each topically 2 times daily (Patient not taking: Reported on 2/13/2024) 453 g 1         DISPOSITION      2/21/24 Intake phone screen completed. Patient denied MTM scheduling. Scheduled with Fiordaliza Jasso for AGE on 3/26/24 at 7:30am virtually.     Melanie Roger, Complex .

## 2024-10-23 ENCOUNTER — PATIENT OUTREACH (OUTPATIENT)
Dept: OBGYN | Facility: CLINIC | Age: 39
End: 2024-10-23
Payer: COMMERCIAL

## 2024-10-23 NOTE — TELEPHONE ENCOUNTER
Patient due for Pap and HPV.    Reminder done today via [employer].    11/21/24 visit scheduled- notes added

## 2024-11-18 DIAGNOSIS — L70.9 ACNE, UNSPECIFIED ACNE TYPE: ICD-10-CM

## 2024-11-18 RX ORDER — DROSPIRENONE AND ETHINYL ESTRADIOL 0.02-3(28)
1 KIT ORAL DAILY
Qty: 112 TABLET | Refills: 0 | Status: SHIPPED | OUTPATIENT
Start: 2024-11-18 | End: 2024-11-21

## 2024-11-18 NOTE — TELEPHONE ENCOUNTER
Requested Prescriptions   Pending Prescriptions Disp Refills    drospirenone-ethinyl estradiol (DESHAWN) 3-0.02 MG tablet [Pharmacy Med Name: DROSPIRENONE/ETHY EST 3/0.02MG T 28] 112 tablet 4     Sig: TAKE ONE TABLET BY MOUTH DAILY       Contraceptives Protocol Failed - 11/18/2024 12:29 PM        Failed - Recent (12 mo) or future (90 days) visit within the authorizing provider's specialty     The patient must have completed an in-person or virtual visit within the past 12 months or has a future visit scheduled within the next 90 days with the authorizing provider s specialty.  Urgent care and e-visits do not qualify as an office visit for this protocol.          Passed - Patient is not a current smoker if age is 35 or older        Passed - Medication is active on med list        Passed - Medication indicated for associated diagnosis     Medication is associated with one or more of the following diagnoses:  Contraception  Acne  Dysmenorrhea  Menorrhagia  Amenorrhea  PCOS  Premenstrual Dysphoric Disorder  Irregular menses  Endometriosis  Contraceptive counseling  Finding of menstrual bleeding  Education about oral contraception  Uses contraception  Initial prescription of oral contraception  Oral contraception-no problem  Oral contraceptive repeat          Passed - No active pregnancy on record        Passed - No positive pregnancy test in past 12 months           Appt 11/21/24  Refilled to avoid missing any pills in the meantime  Le Buitrago RN on 11/18/2024 at 12:31 PM    Can

## 2024-11-21 ENCOUNTER — OFFICE VISIT (OUTPATIENT)
Dept: OBGYN | Facility: CLINIC | Age: 39
End: 2024-11-21
Payer: COMMERCIAL

## 2024-11-21 VITALS — HEIGHT: 65 IN | BODY MASS INDEX: 26.16 KG/M2 | WEIGHT: 157 LBS

## 2024-11-21 DIAGNOSIS — Z30.41 ENCOUNTER FOR SURVEILLANCE OF CONTRACEPTIVE PILLS: ICD-10-CM

## 2024-11-21 DIAGNOSIS — Z01.419 ENCOUNTER FOR GYNECOLOGICAL EXAMINATION WITHOUT ABNORMAL FINDING: Primary | ICD-10-CM

## 2024-11-21 DIAGNOSIS — Z13.6 ENCOUNTER FOR LIPID SCREENING FOR CARDIOVASCULAR DISEASE: ICD-10-CM

## 2024-11-21 DIAGNOSIS — L70.9 ACNE, UNSPECIFIED ACNE TYPE: ICD-10-CM

## 2024-11-21 DIAGNOSIS — Z13.220 ENCOUNTER FOR LIPID SCREENING FOR CARDIOVASCULAR DISEASE: ICD-10-CM

## 2024-11-21 PROCEDURE — G0145 SCR C/V CYTO,THINLAYER,RESCR: HCPCS | Performed by: OBSTETRICS & GYNECOLOGY

## 2024-11-21 PROCEDURE — 99395 PREV VISIT EST AGE 18-39: CPT | Performed by: OBSTETRICS & GYNECOLOGY

## 2024-11-21 PROCEDURE — 87624 HPV HI-RISK TYP POOLED RSLT: CPT | Performed by: OBSTETRICS & GYNECOLOGY

## 2024-11-21 PROCEDURE — 99459 PELVIC EXAMINATION: CPT | Performed by: OBSTETRICS & GYNECOLOGY

## 2024-11-21 RX ORDER — DROSPIRENONE AND ETHINYL ESTRADIOL 0.02-3(28)
1 KIT ORAL DAILY
Qty: 112 TABLET | Refills: 5 | Status: SHIPPED | OUTPATIENT
Start: 2024-11-21

## 2024-11-21 ASSESSMENT — PATIENT HEALTH QUESTIONNAIRE - PHQ9
SUM OF ALL RESPONSES TO PHQ QUESTIONS 1-9: 0
5. POOR APPETITE OR OVEREATING: NOT AT ALL

## 2024-11-21 ASSESSMENT — ANXIETY QUESTIONNAIRES
IF YOU CHECKED OFF ANY PROBLEMS ON THIS QUESTIONNAIRE, HOW DIFFICULT HAVE THESE PROBLEMS MADE IT FOR YOU TO DO YOUR WORK, TAKE CARE OF THINGS AT HOME, OR GET ALONG WITH OTHER PEOPLE: NOT DIFFICULT AT ALL
GAD7 TOTAL SCORE: 0
7. FEELING AFRAID AS IF SOMETHING AWFUL MIGHT HAPPEN: NOT AT ALL
6. BECOMING EASILY ANNOYED OR IRRITABLE: NOT AT ALL
5. BEING SO RESTLESS THAT IT IS HARD TO SIT STILL: NOT AT ALL
3. WORRYING TOO MUCH ABOUT DIFFERENT THINGS: NOT AT ALL
GAD7 TOTAL SCORE: 0
1. FEELING NERVOUS, ANXIOUS, OR ON EDGE: NOT AT ALL
2. NOT BEING ABLE TO STOP OR CONTROL WORRYING: NOT AT ALL

## 2024-11-21 NOTE — Clinical Note
Hi. Not sure if this was coded by Dr Rivera as an annual back in 2/24, as her note sort of says it was. Let me know what I need to change Thanks AM

## 2024-11-21 NOTE — PATIENT INSTRUCTIONS
[facility]    -Daily total calcium intake (between food/supplements) should be 1000mg which equates to 3-4 servings calcium containing food per day; VItamin D 2000IU.   Foods rich in calcium are: milk, cheese, yogurt, seafood, sardines and canned salmon, leafy green vegetables such as karo greens, spinach and kale, beans and lentils, almonds, seeds (poppy, sesame, celery, yvonne), rhubarb, dried fruit such as figs, whey protein, tofu and edamame, amaranth, other foods with added calcium such as orange juice and some cereals.   If adequate amount not taken in diet, then a supplement may be needed.     -I also recommend increasing your dietary fiber by starting Metamucil (powder mixed in glass of water) once to twice daily

## 2024-11-21 NOTE — PROGRESS NOTES
Julia is a 39 year old  female who presents for women's health exam.       HPI:  The patient's NO PCP. Did see PCP last year in Feb. Was not aware it was for her annual.     Doing well overall.    Happy with BC pills. Extended dosing.     Has gone through the eval for ADHD, got the diagnosis. Not found a med that works well for her yet. 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   Not dating anyone currently.       GYNECOLOGIC HISTORY:    No LMP recorded. (Menstrual status: Birth Control).    Regular menses? No Birth control  Menses every 3 MONTHS  days.  Length of menses: 4 days    Her current contraception method is: oral contraceptives.  She  reports that she has never smoked. She has never used smokeless tobacco.    Patient is sexually active.  STD testing offered?  Declined  Last PHQ-9 score on record =       2024     3:52 PM   PHQ-9 SCORE   PHQ-9 Total Score 0     Last GAD7 score on record =       2024     3:52 PM   ASAEL-7 SCORE   Total Score 0         HEALTH MAINTENANCE:  Care Gaps  Close care gaps  Overdue          Never done ADVANCE CARE PLANNING (Every 5 Years)     Never done DEPRESSION ACTION PLAN (Once)     DEC 19  2017 DTAP/TDAP/TD IMMUNIZATION (9 - Td or Tdap)  Last completed: Dec 19, 2007          SEP 1  2024 INFLUENZA VACCINE (1)  Last completed: 2023     SEP 1  2024 COVID-19 Vaccine ( season)  Last completed: 2022                   Upcoming          2025 YEARLY PREVENTIVE VISIT (Yearly)  Last completed:  GLUCOSE (Every 3 Years)  Last completed: 2023     OCT 30  2061 RSV VACCINE (1 - 1-dose 75+ series)     Health maintenance updated:  yes    HISTORY:  OB History    Para Term  AB Living   0 0 0 0 0 0   SAB IAB Ectopic Multiple Live Births   0 0 0 0 0       Patient Active Problem List   Diagnosis    Overweight    Acne, unspecified  "acne type    Herpes simplex vulvovaginitis    Anxiety    Moderate episode of recurrent major depressive disorder (H)    S/P LEEP of cervix with PAULY 2-3     Past Surgical History:   Procedure Laterality Date    NO HISTORY OF SURGERY        Social History     Tobacco Use    Smoking status: Never    Smokeless tobacco: Never   Substance Use Topics    Alcohol use: Yes     Alcohol/week: 0.0 standard drinks of alcohol     Comment: socially      Problem (# of Occurrences) Relation (Name,Age of Onset)    Anxiety Disorder (1) Mother    Substance Abuse (2) Mother, Brother    Thyroid Disease (1) Mother    No Known Problems (7) Father, Maternal Grandmother, Maternal Grandfather, Paternal Grandmother, Paternal Grandfather, Sister, Other              Current Outpatient Medications   Medication Sig Dispense Refill    drospirenone-ethinyl estradiol (DESHAWN) 3-0.02 MG tablet Take 1 tablet by mouth daily. 112 tablet 5    atomoxetine (STRATTERA) 40 MG capsule Take 1 capsule (40 mg) by mouth daily for 3 days.  On day four increase to 80 mg PO daily (Patient not taking: Reported on 2/13/2024) 60 capsule 0    clotrimazole (LOTRIMIN) 1 % vaginal cream Place 1 Applicatorful vaginally At Bedtime For 7 days (Patient not taking: Reported on 11/21/2024) 45 g 0    Emollient (CERAVE MOISTURIZING) CREA Externally apply 1 each topically 2 times daily (Patient not taking: Reported on 2/13/2024) 453 g 1     No current facility-administered medications for this visit.     No Known Allergies    Past medical, surgical, social and family histories were reviewed and updated in EPIC.    ROS:   12 point review of systems negative other than symptoms noted below or in the HPI.  No urinary frequency or dysuria, bladder or kidney problems    EXAM:  Ht 1.651 m (5' 5\")   Wt 71.2 kg (157 lb)   BMI 26.13 kg/m     BMI: Body mass index is 26.13 kg/m .    PHYSICAL EXAM:  Constitutional:   Appearance: Well nourished, well developed, alert, in no acute " distress  Neck:  Lymph Nodes:  No lymphadenopathy present    Thyroid:  Gland size normal, nontender, no nodules or masses present  on palpation  Chest:  Respiratory Effort:  Breathing unlabored  Cardiovascular:    Heart: Auscultation:  Regular rate, normal rhythm, no murmurs present  Breasts: Inspection of Breasts:  No lymphadenopathy present., Palpation of Breasts and Axillae:  No masses present on palpation, no breast tenderness., Axillary Lymph Nodes:  No lymphadenopathy present., and No nodularity, asymmetry or nipple discharge bilaterally.  Gastrointestinal:   Abdominal Examination:  Abdomen nontender to palpation, tone normal without rigidity or guarding, no masses present, umbilicus without lesions   Liver and Spleen:  No hepatomegaly present, liver nontender to palpation    Hernias:  No hernias present  Lymphatic: Lymph Nodes:  No other lymphadenopathy present  Skin:  General Inspection:  No rashes present, no lesions present, no areas of  discoloration  Neurologic:    Mental Status:  Oriented X3.  Normal strength and tone, sensory exam                grossly normal, mentation intact and speech normal.    Psychiatric:   Mentation appears normal and affect normal/bright.         Pelvic Exam:  External Genitalia:     Normal appearance for age, no discharge present, no tenderness present, no inflammatory lesions present, color normal  Vagina:     Normal vaginal vault without central or paravaginal defects, no discharge present, no inflammatory lesions present, no masses present  Bladder:     Nontender to palpation  Urethra:   Urethral Body:  Urethra palpation normal, urethra structural support normal   Urethral Meatus:  No erythema or lesions present  Cervix:     Appearance healthy, no lesions present, nontender to palpation, no bleeding present  Uterus:     Uterus: firm, normal sized and nontender, midplane in position.   Adnexa:     No adnexal tenderness present, no adnexal masses present  Perineum:      Perineum within normal limits, no evidence of trauma, no rashes or skin lesions present  Anus:     Anus within normal limits, no hemorrhoids present  Inguinal Lymph Nodes:     No lymphadenopathy present  Pubic Hair:     Normal pubic hair distribution for age  Genitalia and Groin:     No rashes present, no lesions present, no areas of discoloration, no masses present    COUNSELING:   Reviewed preventive health counseling, as reflected in patient instructions       Osteoporosis prevention/bone health    BMI: Body mass index is 26.13 kg/m .      ASSESSMENT:  39 year old female with satisfactory women's health exam.    ICD-10-CM    1. Encounter for gynecological examination without abnormal finding  Z01.419 HPV and Gynecologic Cytology Panel - Recommended Age 30 - 65 Years     Gynecologic Cytology (PAP)     Lipid panel reflex to direct LDL Fasting      2. Acne, unspecified acne type  L70.9 drospirenone-ethinyl estradiol (DESHAWN) 3-0.02 MG tablet      3. Encounter for surveillance of contraceptive pills  Z30.41       4. Encounter for lipid screening for cardiovascular disease  Z13.220 Lipid panel reflex to direct LDL Fasting    Z13.6           PLAN:  -Pap/hpv obtained for cervical cancer screening. Manage per guidelines, including q 3yr pap testing for those <30 and q 5yr pap/hpv for those >30 when appropriate.   -Breast self awareness discussed.   -Has not had cholesterol screening. Will order labs for return when fasting  -Refill OCP for the year. Doing well.   -Note in EPIC from PCP visit in Feb seems to report annual, but not sure if billed this way. Will verify  Return one year for next women's health exam          Courtney Rosenthals, DO

## 2024-11-22 LAB
HPV HR 12 DNA CVX QL NAA+PROBE: NEGATIVE
HPV16 DNA CVX QL NAA+PROBE: NEGATIVE
HPV18 DNA CVX QL NAA+PROBE: NEGATIVE
HUMAN PAPILLOMA VIRUS FINAL DIAGNOSIS: NORMAL

## 2024-11-27 LAB
BKR AP ASSOCIATED HPV REPORT: NORMAL
BKR LAB AP GYN ADEQUACY: NORMAL
BKR LAB AP GYN INTERPRETATION: NORMAL
BKR LAB AP PREVIOUS ABNORMAL: NORMAL
PATH REPORT.COMMENTS IMP SPEC: NORMAL
PATH REPORT.COMMENTS IMP SPEC: NORMAL
PATH REPORT.RELEVANT HX SPEC: NORMAL